# Patient Record
Sex: FEMALE | Race: OTHER | NOT HISPANIC OR LATINO
[De-identification: names, ages, dates, MRNs, and addresses within clinical notes are randomized per-mention and may not be internally consistent; named-entity substitution may affect disease eponyms.]

---

## 2023-01-01 ENCOUNTER — APPOINTMENT (OUTPATIENT)
Dept: UROLOGY | Facility: CLINIC | Age: 88
End: 2023-01-01

## 2023-01-01 ENCOUNTER — APPOINTMENT (OUTPATIENT)
Dept: UROLOGY | Facility: CLINIC | Age: 88
End: 2023-01-01
Payer: MEDICARE

## 2023-01-01 ENCOUNTER — TRANSCRIPTION ENCOUNTER (OUTPATIENT)
Age: 88
End: 2023-01-01

## 2023-01-01 VITALS
DIASTOLIC BLOOD PRESSURE: 63 MMHG | OXYGEN SATURATION: 100 % | HEART RATE: 87 BPM | SYSTOLIC BLOOD PRESSURE: 177 MMHG | BODY MASS INDEX: 20.98 KG/M2 | WEIGHT: 114 LBS | RESPIRATION RATE: 16 BRPM | TEMPERATURE: 97.8 F | HEIGHT: 62 IN

## 2023-01-01 DIAGNOSIS — C67.9 MALIGNANT NEOPLASM OF BLADDER, UNSPECIFIED: ICD-10-CM

## 2023-01-01 LAB — URINE CYTOLOGY: NORMAL

## 2023-01-01 PROCEDURE — 99024 POSTOP FOLLOW-UP VISIT: CPT

## 2023-01-01 PROCEDURE — 52000 CYSTOURETHROSCOPY: CPT

## 2023-04-16 ENCOUNTER — INPATIENT (INPATIENT)
Facility: HOSPITAL | Age: 88
LOS: 5 days | Discharge: HOME HEALTH | End: 2023-04-22
Attending: STUDENT IN AN ORGANIZED HEALTH CARE EDUCATION/TRAINING PROGRAM | Admitting: STUDENT IN AN ORGANIZED HEALTH CARE EDUCATION/TRAINING PROGRAM
Payer: MEDICARE

## 2023-04-16 VITALS
HEART RATE: 95 BPM | DIASTOLIC BLOOD PRESSURE: 73 MMHG | RESPIRATION RATE: 16 BRPM | TEMPERATURE: 98 F | SYSTOLIC BLOOD PRESSURE: 170 MMHG | OXYGEN SATURATION: 100 %

## 2023-04-16 RX ORDER — SODIUM CHLORIDE 9 MG/ML
1000 INJECTION INTRAMUSCULAR; INTRAVENOUS; SUBCUTANEOUS ONCE
Refills: 0 | Status: DISCONTINUED | OUTPATIENT
Start: 2023-04-16 | End: 2023-04-16

## 2023-04-16 RX ORDER — ONDANSETRON 8 MG/1
4 TABLET, FILM COATED ORAL ONCE
Refills: 0 | Status: COMPLETED | OUTPATIENT
Start: 2023-04-16 | End: 2023-04-16

## 2023-04-16 RX ORDER — SODIUM CHLORIDE 9 MG/ML
500 INJECTION INTRAMUSCULAR; INTRAVENOUS; SUBCUTANEOUS ONCE
Refills: 0 | Status: COMPLETED | OUTPATIENT
Start: 2023-04-16 | End: 2023-04-16

## 2023-04-16 RX ORDER — ACETAMINOPHEN 500 MG
1000 TABLET ORAL ONCE
Refills: 0 | Status: COMPLETED | OUTPATIENT
Start: 2023-04-16 | End: 2023-04-16

## 2023-04-16 RX ADMIN — SODIUM CHLORIDE 500 MILLILITER(S): 9 INJECTION INTRAMUSCULAR; INTRAVENOUS; SUBCUTANEOUS at 23:27

## 2023-04-16 RX ADMIN — ONDANSETRON 4 MILLIGRAM(S): 8 TABLET, FILM COATED ORAL at 23:28

## 2023-04-16 RX ADMIN — Medication 1000 MILLIGRAM(S): at 23:58

## 2023-04-16 RX ADMIN — Medication 400 MILLIGRAM(S): at 23:28

## 2023-04-16 NOTE — ED PROVIDER NOTE - NS ED ATTENDING STATEMENT MOD
This was a shared visit with the ANTHOYN. I reviewed and verified the documentation and independently performed the documented:

## 2023-04-16 NOTE — ED ADULT NURSE NOTE - OBJECTIVE STATEMENT
Pt received to room 25. Pt A&Ox3, ambulatory x1. Pt c/o abd pain, n/v, dysuria. Pt denies any bloody emesis, CP. Pmhx of HTN, noncompliant with meds. RR even and unlabored, pt in NAD. IV access established with 20G to R AC, labs collected and sent as per MD orders. Pt 99.1 rectally. Pt medicated as per MAR. Denies any CP, dizziness, headache. Safety measures in place, family at bedside.

## 2023-04-16 NOTE — ED PROVIDER NOTE - CLINICAL SUMMARY MEDICAL DECISION MAKING FREE TEXT BOX
87 y/o female pmh htn, hld, dementia ( a x o x2) brought by daughter, states that x 1 day shes renuka c/o severe abdominal pain, nausea, unable  to tolerate PO, + dysuria, getting worse today, denies any HA, neck pain, cough, f/c/, chest pain, sob, numbness/weakness/tingling, recent travel, sick contact, social history  on exam: + rlq, llq, periumbilical tenderness, labs, meds, ct, ua, reasses

## 2023-04-16 NOTE — ED ADULT NURSE NOTE - CHIEF COMPLAINT QUOTE
Pt. c/o abdominal pain x 2 days. Endorses urinary frequency, burning with urination, nausea, vomiting, b/l flank pain, SOB. Denies fever, chills, hematuria, chest pain.  PMHx: Early Dementia , HTN

## 2023-04-16 NOTE — ED PROVIDER NOTE - OBJECTIVE STATEMENT
87 y/o female pmh htn, hld, dementia ( a x o x2) brought by daughter, states that x 1 day shes renuka c/o severe abdominal pain, nausea, unable  to tolerate PO, + dysuria, getting worse today, denies any HA, neck pain, cough, f/c/, chest pain, sob, numbness/weakness/tingling, recent travel, sick contact, social history

## 2023-04-16 NOTE — ED PROVIDER NOTE - ATTENDING APP SHARED VISIT CONTRIBUTION OF CARE
88 year old with diffuse abd pain. concern for pancreatitis will get lipase concern for acs will get trop and ekg concern for intrabdominal process will get ct to rule out sbo enteritis, diverticulitis, bowel perf, pyelo. ua for uti. fluids, symptom control. likely admit.

## 2023-04-17 DIAGNOSIS — E87.1 HYPO-OSMOLALITY AND HYPONATREMIA: ICD-10-CM

## 2023-04-17 DIAGNOSIS — I10 ESSENTIAL (PRIMARY) HYPERTENSION: ICD-10-CM

## 2023-04-17 DIAGNOSIS — E89.0 POSTPROCEDURAL HYPOTHYROIDISM: Chronic | ICD-10-CM

## 2023-04-17 DIAGNOSIS — F03.90 UNSPECIFIED DEMENTIA WITHOUT BEHAVIORAL DISTURBANCE: ICD-10-CM

## 2023-04-17 DIAGNOSIS — E03.9 HYPOTHYROIDISM, UNSPECIFIED: ICD-10-CM

## 2023-04-17 DIAGNOSIS — K85.90 ACUTE PANCREATITIS WITHOUT NECROSIS OR INFECTION, UNSPECIFIED: ICD-10-CM

## 2023-04-17 DIAGNOSIS — N32.89 OTHER SPECIFIED DISORDERS OF BLADDER: ICD-10-CM

## 2023-04-17 DIAGNOSIS — Z29.9 ENCOUNTER FOR PROPHYLACTIC MEASURES, UNSPECIFIED: ICD-10-CM

## 2023-04-17 LAB
ALBUMIN SERPL ELPH-MCNC: 4.3 G/DL — SIGNIFICANT CHANGE UP (ref 3.3–5)
ALP SERPL-CCNC: 119 U/L — SIGNIFICANT CHANGE UP (ref 40–120)
ALT FLD-CCNC: 20 U/L — SIGNIFICANT CHANGE UP (ref 4–33)
ANION GAP SERPL CALC-SCNC: 11 MMOL/L — SIGNIFICANT CHANGE UP (ref 7–14)
ANION GAP SERPL CALC-SCNC: 14 MMOL/L — SIGNIFICANT CHANGE UP (ref 7–14)
AST SERPL-CCNC: 34 U/L — HIGH (ref 4–32)
B PERT DNA SPEC QL NAA+PROBE: SIGNIFICANT CHANGE UP
B PERT+PARAPERT DNA PNL SPEC NAA+PROBE: SIGNIFICANT CHANGE UP
BASOPHILS # BLD AUTO: 0.04 K/UL — SIGNIFICANT CHANGE UP (ref 0–0.2)
BASOPHILS NFR BLD AUTO: 0.4 % — SIGNIFICANT CHANGE UP (ref 0–2)
BILIRUB SERPL-MCNC: 1 MG/DL — SIGNIFICANT CHANGE UP (ref 0.2–1.2)
BORDETELLA PARAPERTUSSIS (RAPRVP): SIGNIFICANT CHANGE UP
BUN SERPL-MCNC: 18 MG/DL — SIGNIFICANT CHANGE UP (ref 7–23)
BUN SERPL-MCNC: 22 MG/DL — SIGNIFICANT CHANGE UP (ref 7–23)
C PNEUM DNA SPEC QL NAA+PROBE: SIGNIFICANT CHANGE UP
CALCIUM SERPL-MCNC: 8.1 MG/DL — LOW (ref 8.4–10.5)
CALCIUM SERPL-MCNC: 9.6 MG/DL — SIGNIFICANT CHANGE UP (ref 8.4–10.5)
CHLORIDE SERPL-SCNC: 96 MMOL/L — LOW (ref 98–107)
CHLORIDE SERPL-SCNC: 97 MMOL/L — LOW (ref 98–107)
CO2 SERPL-SCNC: 17 MMOL/L — LOW (ref 22–31)
CO2 SERPL-SCNC: 22 MMOL/L — SIGNIFICANT CHANGE UP (ref 22–31)
CREAT SERPL-MCNC: 0.99 MG/DL — SIGNIFICANT CHANGE UP (ref 0.5–1.3)
CREAT SERPL-MCNC: 1.25 MG/DL — SIGNIFICANT CHANGE UP (ref 0.5–1.3)
EGFR: 42 ML/MIN/1.73M2 — LOW
EGFR: 55 ML/MIN/1.73M2 — LOW
EOSINOPHIL # BLD AUTO: 0.02 K/UL — SIGNIFICANT CHANGE UP (ref 0–0.5)
EOSINOPHIL NFR BLD AUTO: 0.2 % — SIGNIFICANT CHANGE UP (ref 0–6)
FLUAV SUBTYP SPEC NAA+PROBE: SIGNIFICANT CHANGE UP
FLUBV RNA SPEC QL NAA+PROBE: SIGNIFICANT CHANGE UP
GLUCOSE SERPL-MCNC: 112 MG/DL — HIGH (ref 70–99)
GLUCOSE SERPL-MCNC: 90 MG/DL — SIGNIFICANT CHANGE UP (ref 70–99)
HADV DNA SPEC QL NAA+PROBE: SIGNIFICANT CHANGE UP
HCOV 229E RNA SPEC QL NAA+PROBE: SIGNIFICANT CHANGE UP
HCOV HKU1 RNA SPEC QL NAA+PROBE: SIGNIFICANT CHANGE UP
HCOV NL63 RNA SPEC QL NAA+PROBE: SIGNIFICANT CHANGE UP
HCOV OC43 RNA SPEC QL NAA+PROBE: SIGNIFICANT CHANGE UP
HCT VFR BLD CALC: 41.5 % — SIGNIFICANT CHANGE UP (ref 34.5–45)
HCT VFR BLD CALC: 43.4 % — SIGNIFICANT CHANGE UP (ref 34.5–45)
HGB BLD-MCNC: 13.2 G/DL — SIGNIFICANT CHANGE UP (ref 11.5–15.5)
HGB BLD-MCNC: 13.3 G/DL — SIGNIFICANT CHANGE UP (ref 11.5–15.5)
HMPV RNA SPEC QL NAA+PROBE: SIGNIFICANT CHANGE UP
HPIV1 RNA SPEC QL NAA+PROBE: SIGNIFICANT CHANGE UP
HPIV2 RNA SPEC QL NAA+PROBE: SIGNIFICANT CHANGE UP
HPIV3 RNA SPEC QL NAA+PROBE: SIGNIFICANT CHANGE UP
HPIV4 RNA SPEC QL NAA+PROBE: SIGNIFICANT CHANGE UP
IANC: 7.6 K/UL — HIGH (ref 1.8–7.4)
IMM GRANULOCYTES NFR BLD AUTO: 0.4 % — SIGNIFICANT CHANGE UP (ref 0–0.9)
LIDOCAIN IGE QN: 230 U/L — HIGH (ref 7–60)
LYMPHOCYTES # BLD AUTO: 0.88 K/UL — LOW (ref 1–3.3)
LYMPHOCYTES # BLD AUTO: 9.6 % — LOW (ref 13–44)
M PNEUMO DNA SPEC QL NAA+PROBE: SIGNIFICANT CHANGE UP
MAGNESIUM SERPL-MCNC: 2 MG/DL — SIGNIFICANT CHANGE UP (ref 1.6–2.6)
MCHC RBC-ENTMCNC: 29.6 PG — SIGNIFICANT CHANGE UP (ref 27–34)
MCHC RBC-ENTMCNC: 30.3 PG — SIGNIFICANT CHANGE UP (ref 27–34)
MCHC RBC-ENTMCNC: 30.6 GM/DL — LOW (ref 32–36)
MCHC RBC-ENTMCNC: 31.8 GM/DL — LOW (ref 32–36)
MCV RBC AUTO: 95.2 FL — SIGNIFICANT CHANGE UP (ref 80–100)
MCV RBC AUTO: 96.4 FL — SIGNIFICANT CHANGE UP (ref 80–100)
MONOCYTES # BLD AUTO: 0.58 K/UL — SIGNIFICANT CHANGE UP (ref 0–0.9)
MONOCYTES NFR BLD AUTO: 6.3 % — SIGNIFICANT CHANGE UP (ref 2–14)
NEUTROPHILS # BLD AUTO: 7.6 K/UL — HIGH (ref 1.8–7.4)
NEUTROPHILS NFR BLD AUTO: 83.1 % — HIGH (ref 43–77)
NRBC # BLD: 0 /100 WBCS — SIGNIFICANT CHANGE UP (ref 0–0)
NRBC # BLD: 0 /100 WBCS — SIGNIFICANT CHANGE UP (ref 0–0)
NRBC # FLD: 0 K/UL — SIGNIFICANT CHANGE UP (ref 0–0)
NRBC # FLD: 0 K/UL — SIGNIFICANT CHANGE UP (ref 0–0)
OSMOLALITY SERPL: 295 MOSM/KG — SIGNIFICANT CHANGE UP (ref 275–295)
PHOSPHATE SERPL-MCNC: 2.9 MG/DL — SIGNIFICANT CHANGE UP (ref 2.5–4.5)
PLATELET # BLD AUTO: 331 K/UL — SIGNIFICANT CHANGE UP (ref 150–400)
PLATELET # BLD AUTO: 338 K/UL — SIGNIFICANT CHANGE UP (ref 150–400)
POTASSIUM SERPL-MCNC: 4.1 MMOL/L — SIGNIFICANT CHANGE UP (ref 3.5–5.3)
POTASSIUM SERPL-MCNC: 4.4 MMOL/L — SIGNIFICANT CHANGE UP (ref 3.5–5.3)
POTASSIUM SERPL-SCNC: 4.1 MMOL/L — SIGNIFICANT CHANGE UP (ref 3.5–5.3)
POTASSIUM SERPL-SCNC: 4.4 MMOL/L — SIGNIFICANT CHANGE UP (ref 3.5–5.3)
PROT SERPL-MCNC: 8 G/DL — SIGNIFICANT CHANGE UP (ref 6–8.3)
RAPID RVP RESULT: SIGNIFICANT CHANGE UP
RBC # BLD: 4.36 M/UL — SIGNIFICANT CHANGE UP (ref 3.8–5.2)
RBC # BLD: 4.5 M/UL — SIGNIFICANT CHANGE UP (ref 3.8–5.2)
RBC # FLD: 13 % — SIGNIFICANT CHANGE UP (ref 10.3–14.5)
RBC # FLD: 13.1 % — SIGNIFICANT CHANGE UP (ref 10.3–14.5)
RSV RNA SPEC QL NAA+PROBE: SIGNIFICANT CHANGE UP
RV+EV RNA SPEC QL NAA+PROBE: SIGNIFICANT CHANGE UP
SARS-COV-2 RNA SPEC QL NAA+PROBE: SIGNIFICANT CHANGE UP
SODIUM SERPL-SCNC: 124 MMOL/L — LOW (ref 135–145)
SODIUM SERPL-SCNC: 133 MMOL/L — LOW (ref 135–145)
TSH SERPL-MCNC: 8.04 UIU/ML — HIGH (ref 0.27–4.2)
WBC # BLD: 6.99 K/UL — SIGNIFICANT CHANGE UP (ref 3.8–10.5)
WBC # BLD: 9.16 K/UL — SIGNIFICANT CHANGE UP (ref 3.8–10.5)
WBC # FLD AUTO: 6.99 K/UL — SIGNIFICANT CHANGE UP (ref 3.8–10.5)
WBC # FLD AUTO: 9.16 K/UL — SIGNIFICANT CHANGE UP (ref 3.8–10.5)

## 2023-04-17 RX ORDER — ATORVASTATIN CALCIUM 80 MG/1
1 TABLET, FILM COATED ORAL
Refills: 0 | DISCHARGE

## 2023-04-17 RX ORDER — OLANZAPINE 15 MG/1
2.5 TABLET, FILM COATED ORAL ONCE
Refills: 0 | Status: COMPLETED | OUTPATIENT
Start: 2023-04-17 | End: 2023-04-17

## 2023-04-17 RX ORDER — SODIUM CHLORIDE 9 MG/ML
1000 INJECTION, SOLUTION INTRAVENOUS
Refills: 0 | Status: DISCONTINUED | OUTPATIENT
Start: 2023-04-17 | End: 2023-04-18

## 2023-04-17 RX ORDER — ACETAMINOPHEN 500 MG
650 TABLET ORAL EVERY 6 HOURS
Refills: 0 | Status: DISCONTINUED | OUTPATIENT
Start: 2023-04-17 | End: 2023-04-22

## 2023-04-17 RX ORDER — DONEPEZIL HYDROCHLORIDE 10 MG/1
1 TABLET, FILM COATED ORAL
Refills: 0 | DISCHARGE

## 2023-04-17 RX ORDER — HEPARIN SODIUM 5000 [USP'U]/ML
5000 INJECTION INTRAVENOUS; SUBCUTANEOUS EVERY 12 HOURS
Refills: 0 | Status: DISCONTINUED | OUTPATIENT
Start: 2023-04-17 | End: 2023-04-20

## 2023-04-17 RX ORDER — LEVOTHYROXINE SODIUM 125 MCG
75 TABLET ORAL DAILY
Refills: 0 | Status: DISCONTINUED | OUTPATIENT
Start: 2023-04-17 | End: 2023-04-22

## 2023-04-17 RX ORDER — HYDRALAZINE HCL 50 MG
1 TABLET ORAL
Refills: 0 | DISCHARGE

## 2023-04-17 RX ORDER — HYDRALAZINE HCL 50 MG
100 TABLET ORAL EVERY 8 HOURS
Refills: 0 | Status: DISCONTINUED | OUTPATIENT
Start: 2023-04-17 | End: 2023-04-19

## 2023-04-17 RX ORDER — DONEPEZIL HYDROCHLORIDE 10 MG/1
10 TABLET, FILM COATED ORAL AT BEDTIME
Refills: 0 | Status: DISCONTINUED | OUTPATIENT
Start: 2023-04-17 | End: 2023-04-22

## 2023-04-17 RX ORDER — ATORVASTATIN CALCIUM 80 MG/1
10 TABLET, FILM COATED ORAL AT BEDTIME
Refills: 0 | Status: DISCONTINUED | OUTPATIENT
Start: 2023-04-17 | End: 2023-04-22

## 2023-04-17 RX ORDER — LEVOTHYROXINE SODIUM 125 MCG
1 TABLET ORAL
Refills: 0 | DISCHARGE

## 2023-04-17 RX ADMIN — OLANZAPINE 2.5 MILLIGRAM(S): 15 TABLET, FILM COATED ORAL at 19:59

## 2023-04-17 NOTE — H&P ADULT - PROBLEM SELECTOR PLAN 3
Na went from 133 to 124  Hold off on IVF, s/p 500cc of NS in ED  monitor BMP q6H   do not correct more than 6-8meq in 24 hours  follow up with hypothyroidism workup

## 2023-04-17 NOTE — H&P ADULT - PROBLEM SELECTOR PLAN 1
Lipase: 230, + epigastric pain  Triglycerides wnl , no hx of etoh abuse   Follow up with abdominal u/s to r/o gallstone panmetritis   Tylenol PRN for mild-moderate pain, morphine PRN if severe pain   Holding off on fluids due to Hyponatremia  Diet: Advance as tolerated

## 2023-04-17 NOTE — CONSULT NOTE ADULT - SUBJECTIVE AND OBJECTIVE BOX
HPI  88y F year old female with Hx of HTN, HLD , Hypothyroidism , Dementia presenting for abdominal pain , nausea , decreased Apetite for past 2 days secondary to acute pancreatitis. On CT imaging patient was found to have a [id number] enhancing bladder mass concerning for malignancy Patient denies any prior history of hematuria although recently has been experiencing more urinary frequency/urgency/dysuria. Patient denies history of prior urologic surgeries/procedures and does endorse prior history of UTI's. Patient denies prior smoking history. Patient denies family history of bladder, kidney, and prostate cancer.     PAST MEDICAL & SURGICAL HISTORY:  HTN (hypertension)      Dementia      H/O thyroidectomy    MEDICATIONS  (STANDING):  atorvastatin 10 milliGRAM(s) Oral at bedtime  donepezil 10 milliGRAM(s) Oral at bedtime  heparin   Injectable 5000 Unit(s) SubCutaneous every 12 hours  hydrALAZINE 100 milliGRAM(s) Oral every 8 hours  lactated ringers. 1000 milliLiter(s) (100 mL/Hr) IV Continuous <Continuous>  levothyroxine 75 MICROGram(s) Oral daily    MEDICATIONS  (PRN):  acetaminophen     Tablet .. 650 milliGRAM(s) Oral every 6 hours PRN Temp greater or equal to 38C (100.4F), Mild Pain (1 - 3)      FAMILY HISTORY:  FH: colon cancer (Child)        Allergies    aspirin (Flushing)  penicillin (Unknown)    Intolerances        SOCIAL HISTORY:    REVIEW OF SYSTEMS: Otherwise negative as stated in HPI    Physical Exam  Vital signs  T(C): 36.7 (04-17-23 @ 17:30), Max: 36.9 (04-17-23 @ 08:19)  HR: 90 (04-17-23 @ 17:30)  BP: 144/79 (04-17-23 @ 17:30)  SpO2: 100% (04-17-23 @ 17:30)  Wt(kg): --    Output      Gen:  NAD    Pulm:  No respiratory distress  	  CV:  RRR    GI:  S/ND/mild mid-epigastric tenderness    :  No CVA tenderness    MSK:      LABS:      04-17 @ 12:23    WBC 6.99  / Hct 43.4  / SCr 0.99     04-16 @ 23:29    WBC 9.16  / Hct 41.5  / SCr 1.25     04-17    124<L>  |  96<L>  |  18  ----------------------------<  90  4.4   |  17<L>  |  0.99    Ca    8.1<L>      17 Apr 2023 12:23  Phos  2.9     04-17  Mg     2.00     04-17    TPro  8.0  /  Alb  4.3  /  TBili  1.0  /  DBili  x   /  AST  34<H>  /  ALT  20  /  AlkPhos  119  04-16          Urine Cx:  Blood Cx:    RADIOLOGY:  INTERPRETATION:  CLINICAL INFORMATION: Abdominal pain    COMPARISON: None.    CONTRAST/COMPLICATIONS:  IV Contrast: Omnipaque 350  90cc administered   10 cc discarded  Oral Contrast: NONE  Complications: None reported at time of study completion    PROCEDURE:  CT of the Abdomen and Pelvis was performed.  Sagittal and coronal reformats were performed.    FINDINGS:  LOWER CHEST: Small left pleural effusion. Minimal atelectatic changes.   Coronary artery calcifications. Mitral valve annulus calcification.   Aortic valve calcification.    LIVER: Within normal limits.  BILE DUCTS: Normal caliber.  GALLBLADDER: Within normal limits.  SPLEEN: Within normal limits.  PANCREAS: Within normal limits.  ADRENALS: Within normal limits.  KIDNEYS/URETERS: Bilateral renal cysts. There are several subcentimeter   left renal calcifications. There is no hydroureter sclerosis. Mild   hydronephrosis to the level of the urinary bladder where there is a mass.    BLADDER: There is a large enhancing mass in the bladder suspicious for   malignancy. This likely penetrates the bladder wall on the left. This   measures at least 5.6 x 2.0 x 5.3 cm. There are several adjacent left   pelvic lymph nodes.  REPRODUCTIVE ORGANS: Hysterectomy.    BOWEL: Moderate amount of stool in rectum and colon. No bowel   obstruction. Appendix is not visualized. No evidence of inflammation in   the pericecal region.  PERITONEUM: Small amount of pelvic free fluid.  VESSELS: Atherosclerotic changes.  RETROPERITONEUM/LYMPH NODES: Small left pelvic lymph nodes adjacent to a   bladder mass suspicious for metastatic disease.  ABDOMINAL WALL: Within normal limits.  BONES: Degenerative changes. Grade 1 anterolisthesis L4 on L5.    IMPRESSION:    Subcentimeter nonobstructing left renal calcifications. Mild left   hydronephrosis to the level of the bladder. There is a large enhancing   mass in the bladder suspicious for malignancy as described above. This   measures at least 5.6 x 2.1 x 5.3 cm. Urological consultation is needed.      --- End of Report ---

## 2023-04-17 NOTE — PATIENT PROFILE ADULT - NSPROHMSYMPCOND_GEN_A_NUR
Stable, states ready to go home, voided, refuses fluids, ambulatory to waiting room to    cancer/cardiovascular

## 2023-04-17 NOTE — H&P ADULT - NSHPLABSRESULTS_GEN_ALL_CORE
LABS:                  13.3   6.99  )-----------( 331      ( 17 Apr 2023 12:23 )             43.4     04-17    124<L>  |  96<L>  |  18  ----------------------------<  90  4.4   |  17<L>  |  0.99    Ca    8.1<L>      17 Apr 2023 12:23  Phos  2.9     04-17  Mg     2.00     04-17    TPro  8.0  /  Alb  4.3  /  TBili  1.0  /  DBili  x   /  AST  34 <H>  /  ALT  20  /  AlkPhos  119  04-16    Ct abdomen and pelvis: Subcentimeter nonobstructing left renal calcifications. Mild left hydronephrosis to the level of the bladder. There is a large enhancing   mass in the bladder suspicious for malignancy as described above. This measures at least 5.6 x 2.1 x 5.3 cm. Urological consultation is needed.

## 2023-04-17 NOTE — H&P ADULT - NSHPREVIEWOFSYSTEMS_GEN_ALL_CORE
REVIEW OF SYSTEMS:    CONSTITUTIONAL:  No weakness, fevers or chills  EYES/ENT:  No visual changes;  No vertigo or throat pain   NECK:  No pain or stiffness  RESPIRATORY:  No cough, wheezing, hemoptysis; No shortness of breath  CARDIOVASCULAR:  No chest pain or palpitations  GASTROINTESTINAL:+ epigastric pain, with n/v. No diarrhea or constipation. No melena or hematochezia.  GENITOURINARY:  No dysuria, frequency or hematuria  MUSCULOSKELETAL:  FROM all extremities, normal strength, No calf tenderness  NEUROLOGICAL:  No numbness or weakness  SKIN:  No itching, rashes

## 2023-04-17 NOTE — PATIENT PROFILE ADULT - FALL HARM RISK - HARM RISK INTERVENTIONS

## 2023-04-17 NOTE — H&P ADULT - NSHPPHYSICALEXAM_GEN_ALL_CORE
VITAL SIGNS:  T(F): 98 (04-17-23 @ 15:25), Max: 98.4 (04-17-23 @ 08:19)  HR: 82 (04-17-23 @ 15:25) (79 - 95)  BP: 182/75 (04-17-23 @ 15:25) (151/72 - 190/77)  BP(mean): --  RR: 18 (04-17-23 @ 15:25) (16 - 18)  SpO2: 100% (04-17-23 @ 15:25) (100% - 100%)    PHYSICAL EXAM:    Constitutional: WDWN resting comfortably in bed; NAD  HEENT: NC/AT, PERRL, EOMI, anicteric sclera, no nasal discharge; uvula midline, no oropharyngeal erythema or exudates; MMM  Neck: supple; no JVD or thyromegaly  Respiratory: CTA B/L; no W/R/R, no retractions  Cardiac: +S1/S2; RRR; no M/R/G; PMI non-displaced  Gastrointestinal: soft, NT/ND; no rebound or guarding; +BSx4  Back: spine midline, no bony tenderness or step-offs; no CVAT B/L  Extremities: WWP, no clubbing or cyanosis; no peripheral edema  Musculoskeletal: NROM x4; no joint swelling, tenderness or erythema  Vascular: 2+ radial, femoral, DP/PT pulses B/L  Dermatologic: skin warm, dry and intact; no rashes, wounds, or scars  Lymphatic: no submandibular or cervical LAD  Neurologic: AAOx3; CNII-XII grossly intact; no focal deficits  Psychiatric: affect and characteristics of appearance, verbalizations, behaviors are appropriate, denies SI/HI/AH/VH

## 2023-04-17 NOTE — ED ADULT NURSE REASSESSMENT NOTE - NS ED NURSE REASSESS COMMENT FT1
Pt able to void, however urine sample was discarded. Pt notified that another sample is required. NAD noted, safety in place

## 2023-04-17 NOTE — H&P ADULT - ASSESSMENT
88 year old female with Hx of HTN, HLD , Hypothyroidism , Dementia presenting for abdominal pain , nausea , decreased Apetite for past 2 days. Admitted for Pancreatitis and new Bladder mass finding.

## 2023-04-17 NOTE — H&P ADULT - HISTORY OF PRESENT ILLNESS
88 year old female with Hx of HTN, HLD , Hypothyroidism , Dementia presenting for abdominal pain , nausea , decreased Apetite for past 2 days. Pt accompanied by daughter at bedside, most of hx obtained from daughter . As per daughter patient has also been having increased urinary frequency since January - went to PCP who ruled out UTI. Pt lives alone, able to walk on her own, whoever unable to cook for herself- daughter provides food for patient.   Denies any fever, chills, HA, neck pain, cough, chest pain, sob, numbness/weakness/tingling, recent travel, sick contact.

## 2023-04-17 NOTE — H&P ADULT - PROBLEM SELECTOR PLAN 2
Ct abdomen and pelvis: Subcentimeter nonobstructive left renal calcifications. Mild left hydronephrosis to the level of the bladder. There is a large enhancing mass in the bladder suspicious for malignancy as described above. This measures at least 5.6 x 2.1 x 5.3 cm.   -Urology consulted - appreciate recs  -Follow up with UA

## 2023-04-18 DIAGNOSIS — Z01.818 ENCOUNTER FOR OTHER PREPROCEDURAL EXAMINATION: ICD-10-CM

## 2023-04-18 DIAGNOSIS — E78.5 HYPERLIPIDEMIA, UNSPECIFIED: ICD-10-CM

## 2023-04-18 LAB
ANION GAP SERPL CALC-SCNC: 11 MMOL/L — SIGNIFICANT CHANGE UP (ref 7–14)
ANION GAP SERPL CALC-SCNC: 15 MMOL/L — HIGH (ref 7–14)
APPEARANCE UR: CLEAR — SIGNIFICANT CHANGE UP
BACTERIA # UR AUTO: NEGATIVE — SIGNIFICANT CHANGE UP
BILIRUB UR-MCNC: NEGATIVE — SIGNIFICANT CHANGE UP
BLD GP AB SCN SERPL QL: NEGATIVE — SIGNIFICANT CHANGE UP
BLD GP AB SCN SERPL QL: NEGATIVE — SIGNIFICANT CHANGE UP
BUN SERPL-MCNC: 24 MG/DL — HIGH (ref 7–23)
BUN SERPL-MCNC: 25 MG/DL — HIGH (ref 7–23)
CALCIUM SERPL-MCNC: 9.2 MG/DL — SIGNIFICANT CHANGE UP (ref 8.4–10.5)
CALCIUM SERPL-MCNC: 9.3 MG/DL — SIGNIFICANT CHANGE UP (ref 8.4–10.5)
CHLORIDE SERPL-SCNC: 102 MMOL/L — SIGNIFICANT CHANGE UP (ref 98–107)
CHLORIDE SERPL-SCNC: 102 MMOL/L — SIGNIFICANT CHANGE UP (ref 98–107)
CO2 SERPL-SCNC: 22 MMOL/L — SIGNIFICANT CHANGE UP (ref 22–31)
CO2 SERPL-SCNC: 23 MMOL/L — SIGNIFICANT CHANGE UP (ref 22–31)
COLOR SPEC: YELLOW — SIGNIFICANT CHANGE UP
CREAT SERPL-MCNC: 1.2 MG/DL — SIGNIFICANT CHANGE UP (ref 0.5–1.3)
CREAT SERPL-MCNC: 1.28 MG/DL — SIGNIFICANT CHANGE UP (ref 0.5–1.3)
DIFF PNL FLD: NEGATIVE — SIGNIFICANT CHANGE UP
EGFR: 41 ML/MIN/1.73M2 — LOW
EGFR: 44 ML/MIN/1.73M2 — LOW
EPI CELLS # UR: 5 /HPF — SIGNIFICANT CHANGE UP (ref 0–5)
GLUCOSE SERPL-MCNC: 83 MG/DL — SIGNIFICANT CHANGE UP (ref 70–99)
GLUCOSE SERPL-MCNC: 87 MG/DL — SIGNIFICANT CHANGE UP (ref 70–99)
GLUCOSE UR QL: NEGATIVE — SIGNIFICANT CHANGE UP
HCT VFR BLD CALC: 39.8 % — SIGNIFICANT CHANGE UP (ref 34.5–45)
HGB BLD-MCNC: 12.5 G/DL — SIGNIFICANT CHANGE UP (ref 11.5–15.5)
HYALINE CASTS # UR AUTO: 1 /LPF — SIGNIFICANT CHANGE UP (ref 0–7)
KETONES UR-MCNC: ABNORMAL
LEUKOCYTE ESTERASE UR-ACNC: NEGATIVE — SIGNIFICANT CHANGE UP
MAGNESIUM SERPL-MCNC: 2.3 MG/DL — SIGNIFICANT CHANGE UP (ref 1.6–2.6)
MCHC RBC-ENTMCNC: 29.6 PG — SIGNIFICANT CHANGE UP (ref 27–34)
MCHC RBC-ENTMCNC: 31.4 GM/DL — LOW (ref 32–36)
MCV RBC AUTO: 94.1 FL — SIGNIFICANT CHANGE UP (ref 80–100)
NITRITE UR-MCNC: NEGATIVE — SIGNIFICANT CHANGE UP
NRBC # BLD: 0 /100 WBCS — SIGNIFICANT CHANGE UP (ref 0–0)
NRBC # FLD: 0 K/UL — SIGNIFICANT CHANGE UP (ref 0–0)
PH UR: 6 — SIGNIFICANT CHANGE UP (ref 5–8)
PHOSPHATE SERPL-MCNC: 3.6 MG/DL — SIGNIFICANT CHANGE UP (ref 2.5–4.5)
PLATELET # BLD AUTO: 376 K/UL — SIGNIFICANT CHANGE UP (ref 150–400)
POTASSIUM SERPL-MCNC: 4.2 MMOL/L — SIGNIFICANT CHANGE UP (ref 3.5–5.3)
POTASSIUM SERPL-MCNC: 4.3 MMOL/L — SIGNIFICANT CHANGE UP (ref 3.5–5.3)
POTASSIUM SERPL-SCNC: 4.2 MMOL/L — SIGNIFICANT CHANGE UP (ref 3.5–5.3)
POTASSIUM SERPL-SCNC: 4.3 MMOL/L — SIGNIFICANT CHANGE UP (ref 3.5–5.3)
PROT UR-MCNC: ABNORMAL
RBC # BLD: 4.23 M/UL — SIGNIFICANT CHANGE UP (ref 3.8–5.2)
RBC # FLD: 12.9 % — SIGNIFICANT CHANGE UP (ref 10.3–14.5)
RBC CASTS # UR COMP ASSIST: SIGNIFICANT CHANGE UP /HPF (ref 0–4)
RH IG SCN BLD-IMP: POSITIVE — SIGNIFICANT CHANGE UP
RH IG SCN BLD-IMP: POSITIVE — SIGNIFICANT CHANGE UP
SODIUM SERPL-SCNC: 136 MMOL/L — SIGNIFICANT CHANGE UP (ref 135–145)
SODIUM SERPL-SCNC: 139 MMOL/L — SIGNIFICANT CHANGE UP (ref 135–145)
SP GR SPEC: 1.02 — SIGNIFICANT CHANGE UP (ref 1.01–1.05)
UROBILINOGEN FLD QL: SIGNIFICANT CHANGE UP
WBC # BLD: 7.87 K/UL — SIGNIFICANT CHANGE UP (ref 3.8–10.5)
WBC # FLD AUTO: 7.87 K/UL — SIGNIFICANT CHANGE UP (ref 3.8–10.5)
WBC UR QL: 5 /HPF — SIGNIFICANT CHANGE UP (ref 0–5)

## 2023-04-18 RX ORDER — POLYETHYLENE GLYCOL 3350 17 G/17G
17 POWDER, FOR SOLUTION ORAL ONCE
Refills: 0 | Status: COMPLETED | OUTPATIENT
Start: 2023-04-18 | End: 2023-04-18

## 2023-04-18 RX ORDER — SENNA PLUS 8.6 MG/1
2 TABLET ORAL AT BEDTIME
Refills: 0 | Status: DISCONTINUED | OUTPATIENT
Start: 2023-04-18 | End: 2023-04-22

## 2023-04-18 RX ORDER — LIDOCAINE 4 G/100G
1 CREAM TOPICAL DAILY
Refills: 0 | Status: DISCONTINUED | OUTPATIENT
Start: 2023-04-18 | End: 2023-04-22

## 2023-04-18 RX ORDER — GEMCITABINE 38 MG/ML
2000 INJECTION, SOLUTION INTRAVENOUS ONCE
Refills: 0 | Status: DISCONTINUED | OUTPATIENT
Start: 2023-04-18 | End: 2023-04-18

## 2023-04-18 RX ORDER — MUPIROCIN 20 MG/G
1 OINTMENT TOPICAL
Refills: 0 | Status: DISCONTINUED | OUTPATIENT
Start: 2023-04-18 | End: 2023-04-22

## 2023-04-18 RX ADMIN — Medication 100 MILLIGRAM(S): at 19:05

## 2023-04-18 RX ADMIN — POLYETHYLENE GLYCOL 3350 17 GRAM(S): 17 POWDER, FOR SOLUTION ORAL at 19:05

## 2023-04-18 RX ADMIN — Medication 650 MILLIGRAM(S): at 19:04

## 2023-04-18 RX ADMIN — Medication 650 MILLIGRAM(S): at 12:15

## 2023-04-18 RX ADMIN — DONEPEZIL HYDROCHLORIDE 10 MILLIGRAM(S): 10 TABLET, FILM COATED ORAL at 22:10

## 2023-04-18 RX ADMIN — SODIUM CHLORIDE 100 MILLILITER(S): 9 INJECTION, SOLUTION INTRAVENOUS at 05:52

## 2023-04-18 RX ADMIN — ATORVASTATIN CALCIUM 10 MILLIGRAM(S): 80 TABLET, FILM COATED ORAL at 22:10

## 2023-04-18 RX ADMIN — Medication 100 MILLIGRAM(S): at 09:12

## 2023-04-18 RX ADMIN — SENNA PLUS 2 TABLET(S): 8.6 TABLET ORAL at 22:10

## 2023-04-18 RX ADMIN — Medication 75 MICROGRAM(S): at 09:12

## 2023-04-18 RX ADMIN — Medication 10 MILLIGRAM(S): at 19:05

## 2023-04-18 RX ADMIN — HEPARIN SODIUM 5000 UNIT(S): 5000 INJECTION INTRAVENOUS; SUBCUTANEOUS at 19:06

## 2023-04-18 RX ADMIN — Medication 650 MILLIGRAM(S): at 11:13

## 2023-04-18 RX ADMIN — MUPIROCIN 1 APPLICATION(S): 20 OINTMENT TOPICAL at 19:05

## 2023-04-18 NOTE — PROGRESS NOTE ADULT - ATTENDING COMMENTS
Patient seen and examined, care plan discussed with house staff as above.    88yoF admitted with increased urinary frequency, abdominal pain and dyspnea on exertion. Found to have a large ([id number]) bladder mass on admission CT, as well as constipation and a small L pleural effusion. Cardiac murmur (systolic) heart on exam, loudest at the LSB and apex. Family reports she has had an echo w/i the past year, will procure records [ ]. EKG nonischemic. F/u TTE [ ] prior to pt going for her cystoscopy. UA noninfectious. Treat constipation (miralax oral and dulcolax suppository). Urology consulted, planning for cystoscopy and TURBT this admission.     PT dejuan.

## 2023-04-18 NOTE — PROGRESS NOTE ADULT - SUBJECTIVE AND OBJECTIVE BOX
Subjective    Patient seen and examined with daughter at bedside.   Daughter says mom is out of it this morning from anxiety medication.   Discussed plan for OR, all questions answered.     Objective    Vital signs  T(F): , Max: 98.4 (04-17-23 @ 08:19)  HR: 89 (04-18-23 @ 05:47)  BP: 143/68 (04-18-23 @ 05:47)  SpO2: 99% (04-18-23 @ 05:47)  Wt(kg): --    Output       General: NAD  Abdomen: soft/non-tender/non-distended  : no urine to review     Labs      04-18 @ 07:30    WBC 7.87  / Hct 39.8  / SCr --       04-17 @ 12:23    WBC 6.99  / Hct 43.4  / SCr 0.99

## 2023-04-18 NOTE — PROGRESS NOTE ADULT - SUBJECTIVE AND OBJECTIVE BOX
Anibal Calvert  PGY-1 Resident Physician     Patient is a 88y old  Female who presents with a chief complaint of abdominal pain (2023 08:00)      SUBJECTIVE / OVERNIGHT EVENTS:    MEDICATIONS  (STANDING):  atorvastatin 10 milliGRAM(s) Oral at bedtime  donepezil 10 milliGRAM(s) Oral at bedtime  gemcitabine Bladder Irrigation (eMAR) 2000 milliGRAM(s) IntraVesical once  heparin   Injectable 5000 Unit(s) SubCutaneous every 12 hours  hydrALAZINE 100 milliGRAM(s) Oral every 8 hours  levothyroxine 75 MICROGram(s) Oral daily  polyethylene glycol 3350 17 Gram(s) Oral once  senna 2 Tablet(s) Oral at bedtime    MEDICATIONS  (PRN):  acetaminophen     Tablet .. 650 milliGRAM(s) Oral every 6 hours PRN Temp greater or equal to 38C (100.4F), Mild Pain (1 - 3)  lidocaine   4% Patch 1 Patch Transdermal daily PRN Left Neck Pain    Allergies    aspirin (Flushing)  penicillin (Unknown)    Intolerances        Vital Signs Last 24 Hrs  T(C): 36.8 (2023 05:47), Max: 36.8 (2023 05:47)  T(F): 98.2 (2023 05:47), Max: 98.2 (2023 05:47)  HR: 89 (2023 05:47) (82 - 90)  BP: 143/68 (2023 05:47) (143/68 - 182/75)  BP(mean): --  RR: 18 (2023 05:47) (18 - 18)  SpO2: 99% (2023 05:47) (99% - 100%)    Parameters below as of 2023 05:47  Patient On (Oxygen Delivery Method): room air      Daily Height in cm: 157.48 (2023 15:25)    Daily   I&O's Summary      Physical Exam  GENERAL: NAD. Well-developed.  NEUROLOGICAL: A&O x 4. CN2-12. Strength, Sensation, ROM wnl. Brachial, ankle, babinski reflex wnl. Cerebellar signs (FTN) wnl. Gait appreciated.    HEAD: Atraumatic, normocephalic,   EYES: EOMI, PERRLA, No conjunctival or scleral injection.  NASAL/ORAL: Oral mucosa with moist membranes. Normal dentition. No pharyngeal injection or exudates.                    NECK: No lymphadenopathy. Supple, symmetric and without tracheal deviation.   CARDIAC: RRR w/ normal S1 & S2. No murmurs, rubs. & gallops. Radial & dorsal pedis pulses intact. No carotid bruits.   PULMONARY: CTA b/l w/o accessory muscle use.   GI: Soft, NT, ND, no rebound, no guarding. NABS in 4 quads. No palpable masses. No hepatosplenomegaly. No hernia visualized. No excessive scarring. Negative clancy , psoas, obturator signs.   RENAL: No lower extremity edema. No CVA tenderness.   MSK/DERM:  Examination of the (head/neck/spine/ribs/pelvis, RUE, LUE, RLE, LLE) without misalignment.  Normal ROM without pain, no spinal tenderness, normal muscle strength/tone. No rashes or ulcers noted; no subcutaneous nodules or induration palpable  PSYCH: Appropriate insight/judgment    DIAGNOSTICS:                         12.5   7.87  )-----------( 376      ( 2023 07:30 )             39.8     Hgb Trend: 12.5<--, 13.3<--, 13.2<--  04-18    136  |  102  |  24<H>  ----------------------------<  87  4.2   |  23  |  1.20    Ca    9.2      2023 09:40  Phos  3.6     04-18  Mg     2.30     04-18    TPro  8.0  /  Alb  4.3  /  TBili  1.0  /  DBili  x   /  AST  34<H>  /  ALT  20  /  AlkPhos  119  04-16    CAPILLARY BLOOD GLUCOSE        Creatinine Trend: 1.20<--, 1.28<--, 0.99<--, 1.25<--  LIVER FUNCTIONS - ( 2023 23:29 )  Alb: 4.3 g/dL / Pro: 8.0 g/dL / ALK PHOS: 119 U/L / ALT: 20 U/L / AST: 34 U/L / GGT: x                 Urinalysis Basic - ( 2023 10:51 )    Color: Yellow / Appearance: Clear / S.020 / pH: x  Gluc: x / Ketone: Small  / Bili: Negative / Urobili: <2 mg/dL   Blood: x / Protein: 30 mg/dL / Nitrite: Negative   Leuk Esterase: Negative / RBC: 10-20 /HPF / WBC 5 /HPF   Sq Epi: x / Non Sq Epi: x / Bacteria: Negative           Anibal Calvert  PGY-1 Resident Physician     Patient is a 88y old  Female who presents with a chief complaint of abdominal pain (2023 08:00)    SUBJECTIVE / OVERNIGHT EVENTS:  NAEON. Endorsing neck pain in AM. Remaining ROS (-).     MEDICATIONS  (STANDING):  atorvastatin 10 milliGRAM(s) Oral at bedtime  donepezil 10 milliGRAM(s) Oral at bedtime  gemcitabine Bladder Irrigation (eMAR) 2000 milliGRAM(s) IntraVesical once  heparin   Injectable 5000 Unit(s) SubCutaneous every 12 hours  hydrALAZINE 100 milliGRAM(s) Oral every 8 hours  levothyroxine 75 MICROGram(s) Oral daily  polyethylene glycol 3350 17 Gram(s) Oral once  senna 2 Tablet(s) Oral at bedtime    MEDICATIONS  (PRN):  acetaminophen     Tablet .. 650 milliGRAM(s) Oral every 6 hours PRN Temp greater or equal to 38C (100.4F), Mild Pain (1 - 3)  lidocaine   4% Patch 1 Patch Transdermal daily PRN Left Neck Pain    Allergies    aspirin (Flushing)  penicillin (Unknown)    Intolerances        Vital Signs Last 24 Hrs  T(C): 36.8 (2023 05:47), Max: 36.8 (2023 05:47)  T(F): 98.2 (2023 05:47), Max: 98.2 (2023 05:47)  HR: 89 (2023 05:47) (82 - 90)  BP: 143/68 (2023 05:47) (143/68 - 182/75)  BP(mean): --  RR: 18 (2023 05:47) (18 - 18)  SpO2: 99% (2023 05:47) (99% - 100%)    Parameters below as of 2023 05:47  Patient On (Oxygen Delivery Method): room air      Daily Height in cm: 157.48 (2023 15:25)    Daily   I&O's Summary      Physical Exam  GENERAL: NAD. Well-developed.  NEUROLOGICAL: A&O x 4. CN2-12. Strength, Sensation, ROM wnl. Brachial, ankle, babinski reflex wnl.                   NECK: No lymphadenopathy. Supple, symmetric and without tracheal deviation.   CARDIAC: RRR w/ normal S1 & S2. No murmurs, rubs. & gallops. Radial & dorsal pedis pulses intact. No carotid bruits.   PULMONARY: CTA b/l w/o accessory muscle use.   GI: Soft, NT, ND, no rebound, no guarding. NABS in 4 quads. No palpable masses. No hepatosplenomegaly. No hernia visualized. No excessive scarring. Negative clancy , psoas, obturator signs.   RENAL: No lower extremity edema. No CVA tenderness.   MSK/DERM:  Examination of the (head/neck/spine/ribs/pelvis, RUE, LUE, RLE, LLE) without misalignment.  Normal ROM without pain, no spinal tenderness, normal muscle strength/tone. No rashes or ulcers noted; no subcutaneous nodules or induration palpable  PSYCH: Appropriate insight/judgment    DIAGNOSTICS:                         12.5   7.87  )-----------( 376      ( 2023 07:30 )             39.8     Hgb Trend: 12.5<--, 13.3<--, 13.2<--  04-18    136  |  102  |  24<H>  ----------------------------<  87  4.2   |  23  |  1.20    Ca    9.2      2023 09:40  Phos  3.6     04-18  Mg     2.30     04-18    TPro  8.0  /  Alb  4.3  /  TBili  1.0  /  DBili  x   /  AST  34<H>  /  ALT  20  /  AlkPhos  119  04-16    CAPILLARY BLOOD GLUCOSE        Creatinine Trend: 1.20<--, 1.28<--, 0.99<--, 1.25<--  LIVER FUNCTIONS - ( 2023 23:29 )  Alb: 4.3 g/dL / Pro: 8.0 g/dL / ALK PHOS: 119 U/L / ALT: 20 U/L / AST: 34 U/L / GGT: x                 Urinalysis Basic - ( 2023 10:51 )    Color: Yellow / Appearance: Clear / S.020 / pH: x  Gluc: x / Ketone: Small  / Bili: Negative / Urobili: <2 mg/dL   Blood: x / Protein: 30 mg/dL / Nitrite: Negative   Leuk Esterase: Negative / RBC: 10-20 /HPF / WBC 5 /HPF   Sq Epi: x / Non Sq Epi: x / Bacteria: Negative

## 2023-04-19 LAB
ALBUMIN SERPL ELPH-MCNC: 4.1 G/DL — SIGNIFICANT CHANGE UP (ref 3.3–5)
ALP SERPL-CCNC: 108 U/L — SIGNIFICANT CHANGE UP (ref 40–120)
ALT FLD-CCNC: 21 U/L — SIGNIFICANT CHANGE UP (ref 4–33)
ANION GAP SERPL CALC-SCNC: 15 MMOL/L — HIGH (ref 7–14)
AST SERPL-CCNC: 31 U/L — SIGNIFICANT CHANGE UP (ref 4–32)
BILIRUB SERPL-MCNC: 1.1 MG/DL — SIGNIFICANT CHANGE UP (ref 0.2–1.2)
BUN SERPL-MCNC: 30 MG/DL — HIGH (ref 7–23)
CALCIUM SERPL-MCNC: 9.5 MG/DL — SIGNIFICANT CHANGE UP (ref 8.4–10.5)
CHLORIDE SERPL-SCNC: 102 MMOL/L — SIGNIFICANT CHANGE UP (ref 98–107)
CO2 SERPL-SCNC: 23 MMOL/L — SIGNIFICANT CHANGE UP (ref 22–31)
CREAT SERPL-MCNC: 1.25 MG/DL — SIGNIFICANT CHANGE UP (ref 0.5–1.3)
EGFR: 42 ML/MIN/1.73M2 — LOW
GLUCOSE SERPL-MCNC: 72 MG/DL — SIGNIFICANT CHANGE UP (ref 70–99)
HCT VFR BLD CALC: 44.5 % — SIGNIFICANT CHANGE UP (ref 34.5–45)
HGB BLD-MCNC: 13.9 G/DL — SIGNIFICANT CHANGE UP (ref 11.5–15.5)
MAGNESIUM SERPL-MCNC: 2.5 MG/DL — SIGNIFICANT CHANGE UP (ref 1.6–2.6)
MCHC RBC-ENTMCNC: 30 PG — SIGNIFICANT CHANGE UP (ref 27–34)
MCHC RBC-ENTMCNC: 31.2 GM/DL — LOW (ref 32–36)
MCV RBC AUTO: 95.9 FL — SIGNIFICANT CHANGE UP (ref 80–100)
NRBC # BLD: 0 /100 WBCS — SIGNIFICANT CHANGE UP (ref 0–0)
NRBC # FLD: 0 K/UL — SIGNIFICANT CHANGE UP (ref 0–0)
PHOSPHATE SERPL-MCNC: 3.7 MG/DL — SIGNIFICANT CHANGE UP (ref 2.5–4.5)
PLATELET # BLD AUTO: 418 K/UL — HIGH (ref 150–400)
POTASSIUM SERPL-MCNC: 4.9 MMOL/L — SIGNIFICANT CHANGE UP (ref 3.5–5.3)
POTASSIUM SERPL-SCNC: 4.9 MMOL/L — SIGNIFICANT CHANGE UP (ref 3.5–5.3)
PROT SERPL-MCNC: 8 G/DL — SIGNIFICANT CHANGE UP (ref 6–8.3)
RBC # BLD: 4.64 M/UL — SIGNIFICANT CHANGE UP (ref 3.8–5.2)
RBC # FLD: 13 % — SIGNIFICANT CHANGE UP (ref 10.3–14.5)
SODIUM SERPL-SCNC: 140 MMOL/L — SIGNIFICANT CHANGE UP (ref 135–145)
WBC # BLD: 7.02 K/UL — SIGNIFICANT CHANGE UP (ref 3.8–10.5)
WBC # FLD AUTO: 7.02 K/UL — SIGNIFICANT CHANGE UP (ref 3.8–10.5)

## 2023-04-19 RX ORDER — POLYETHYLENE GLYCOL 3350 17 G/17G
17 POWDER, FOR SOLUTION ORAL DAILY
Refills: 0 | Status: DISCONTINUED | OUTPATIENT
Start: 2023-04-19 | End: 2023-04-20

## 2023-04-19 RX ORDER — HYDRALAZINE HCL 50 MG
100 TABLET ORAL EVERY 8 HOURS
Refills: 0 | Status: DISCONTINUED | OUTPATIENT
Start: 2023-04-19 | End: 2023-04-22

## 2023-04-19 RX ORDER — LACTOBACILLUS ACIDOPHILUS 100MM CELL
1 CAPSULE ORAL DAILY
Refills: 0 | Status: DISCONTINUED | OUTPATIENT
Start: 2023-04-19 | End: 2023-04-22

## 2023-04-19 RX ORDER — DIBUCAINE 1 %
1 OINTMENT (GRAM) RECTAL DAILY
Refills: 0 | Status: DISCONTINUED | OUTPATIENT
Start: 2023-04-19 | End: 2023-04-22

## 2023-04-19 RX ORDER — OLANZAPINE 15 MG/1
2.5 TABLET, FILM COATED ORAL ONCE
Refills: 0 | Status: COMPLETED | OUTPATIENT
Start: 2023-04-19 | End: 2023-04-19

## 2023-04-19 RX ORDER — LATANOPROST 0.05 MG/ML
1 SOLUTION/ DROPS OPHTHALMIC; TOPICAL AT BEDTIME
Refills: 0 | Status: DISCONTINUED | OUTPATIENT
Start: 2023-04-19 | End: 2023-04-22

## 2023-04-19 RX ADMIN — MUPIROCIN 1 APPLICATION(S): 20 OINTMENT TOPICAL at 05:16

## 2023-04-19 RX ADMIN — Medication 650 MILLIGRAM(S): at 16:08

## 2023-04-19 RX ADMIN — LIDOCAINE 1 PATCH: 4 CREAM TOPICAL at 15:23

## 2023-04-19 RX ADMIN — Medication 75 MICROGRAM(S): at 05:15

## 2023-04-19 RX ADMIN — Medication 100 MILLIGRAM(S): at 21:27

## 2023-04-19 RX ADMIN — LATANOPROST 1 DROP(S): 0.05 SOLUTION/ DROPS OPHTHALMIC; TOPICAL at 22:02

## 2023-04-19 RX ADMIN — HEPARIN SODIUM 5000 UNIT(S): 5000 INJECTION INTRAVENOUS; SUBCUTANEOUS at 18:44

## 2023-04-19 RX ADMIN — Medication 650 MILLIGRAM(S): at 16:38

## 2023-04-19 RX ADMIN — DONEPEZIL HYDROCHLORIDE 10 MILLIGRAM(S): 10 TABLET, FILM COATED ORAL at 21:28

## 2023-04-19 RX ADMIN — ATORVASTATIN CALCIUM 10 MILLIGRAM(S): 80 TABLET, FILM COATED ORAL at 21:28

## 2023-04-19 RX ADMIN — Medication 10 MILLIGRAM(S): at 13:27

## 2023-04-19 RX ADMIN — HEPARIN SODIUM 5000 UNIT(S): 5000 INJECTION INTRAVENOUS; SUBCUTANEOUS at 05:15

## 2023-04-19 RX ADMIN — SENNA PLUS 2 TABLET(S): 8.6 TABLET ORAL at 21:28

## 2023-04-19 RX ADMIN — Medication 100 MILLIGRAM(S): at 05:15

## 2023-04-19 RX ADMIN — LIDOCAINE 1 PATCH: 4 CREAM TOPICAL at 19:18

## 2023-04-19 RX ADMIN — MUPIROCIN 1 APPLICATION(S): 20 OINTMENT TOPICAL at 18:44

## 2023-04-19 RX ADMIN — OLANZAPINE 2.5 MILLIGRAM(S): 15 TABLET, FILM COATED ORAL at 21:31

## 2023-04-19 NOTE — PROGRESS NOTE ADULT - SUBJECTIVE AND OBJECTIVE BOX
Anibal Calvert  PGY-1 Resident Physician     Patient is a 88y old  Female who presents with a chief complaint of abdominal pain (2023 09:02)      SUBJECTIVE / OVERNIGHT EVENTS:  NAEON. Denies CP, SOB. Endorses abdominal pain (no BM). Remaining ROS (-).     MEDICATIONS  (STANDING):  atorvastatin 10 milliGRAM(s) Oral at bedtime  bisacodyl Suppository 10 milliGRAM(s) Rectal daily  donepezil 10 milliGRAM(s) Oral at bedtime  heparin   Injectable 5000 Unit(s) SubCutaneous every 12 hours  hydrALAZINE 100 milliGRAM(s) Oral every 8 hours  latanoprost 0.005% Ophthalmic Solution 1 Drop(s) Both EYES at bedtime  levothyroxine 75 MICROGram(s) Oral daily  mupirocin 2% Ointment 1 Application(s) Topical two times a day  senna 2 Tablet(s) Oral at bedtime    MEDICATIONS  (PRN):  acetaminophen     Tablet .. 650 milliGRAM(s) Oral every 6 hours PRN Temp greater or equal to 38C (100.4F), Mild Pain (1 - 3)  lidocaine   4% Patch 1 Patch Transdermal daily PRN Left Neck Pain    Allergies    aspirin (Flushing)  penicillin (Unknown)    Intolerances        Vital Signs Last 24 Hrs  T(C): 36.6 (2023 05:08), Max: 36.6 (2023 05:08)  T(F): 97.8 (2023 05:08), Max: 97.8 (2023 05:08)  HR: 93 (2023 05:08) (82 - 93)  BP: 175/78 (2023 05:08) (140/70 - 175/78)  BP(mean): --  RR: 17 (2023 05:08) (17 - 18)  SpO2: 100% (2023 05:08) (96% - 100%)    Parameters below as of 2023 05:08  Patient On (Oxygen Delivery Method): room air      Daily     Daily   I&O's Summary      Physical Exam  GENERAL: NAD. Well-developed.  NEUROLOGICAL: A&O x 4. CN2-12. Strength, Sensation, ROM wnl.               NECK: (+) on right shoulder upon palpation  CARDIAC: RRR w/ normal S1 & S2. No murmurs, rubs. & gallops. Radial & dorsal pedis pulses intact. No carotid bruits.   PULMONARY: CTA b/l w/o accessory muscle use.   GI: Soft, NT, ND, no rebound, no guarding. NABS in 4 quads.   RENAL: No lower extremity edema. No CVA tenderness.   MSK/DERM:  Examination of the (head/neck/spine/ribs/pelvis, RUE, LUE, RLE, LLE) without misalignment.  Normal ROM without pain, no spinal tenderness, normal muscle strength/tone. No rashes or ulcers noted; no subcutaneous nodules or induration palpable  PSYCH: Appropriate insight/judgment    DIAGNOSTICS:                         13.9   7.02  )-----------( 418      ( 2023 05:25 )             44.5     Hgb Trend: 13.9<--, 12.5<--, 13.3<--, 13.2<--  04-19    140  |  102  |  30<H>  ----------------------------<  72  4.9   |  23  |  1.25    Ca    9.5      2023 05:25  Phos  3.7     04-19  Mg     2.50     04-19    TPro  8.0  /  Alb  4.1  /  TBili  1.1  /  DBili  x   /  AST  31  /  ALT  21  /  AlkPhos  108  04-19    CAPILLARY BLOOD GLUCOSE        Creatinine Trend: 1.25<--, 1.20<--, 1.28<--, 0.99<--, 1.25<--  LIVER FUNCTIONS - ( 2023 05:25 )  Alb: 4.1 g/dL / Pro: 8.0 g/dL / ALK PHOS: 108 U/L / ALT: 21 U/L / AST: 31 U/L / GGT: x                 Urinalysis Basic - ( 2023 10:51 )    Color: Yellow / Appearance: Clear / S.020 / pH: x  Gluc: x / Ketone: Small  / Bili: Negative / Urobili: <2 mg/dL   Blood: x / Protein: 30 mg/dL / Nitrite: Negative   Leuk Esterase: Negative / RBC: 10-20 /HPF / WBC 5 /HPF   Sq Epi: x / Non Sq Epi: x / Bacteria: Negative

## 2023-04-19 NOTE — DIETITIAN INITIAL EVALUATION ADULT - HEIGHT FOR BMI (CENTIMETERS)
CALL PLACED TO [place], SHE WAS INFORMED THAT DR. RUBIO IS OUT OF THE OFFICE.   THIS DEVICE IS NEW AND CANT BE ENTERED IN [facility], SO SHE NEEDS A HARD COPY TO BE SIGNED AND FAXED BACK TO HER WHEN DR. RUBIO RETURNS TO OFFICE.    157.5

## 2023-04-19 NOTE — DIETITIAN INITIAL EVALUATION ADULT - NS FNS DIET ORDER
Diet, NPO after Midnight:      NPO Start Date: 19-Apr-2023,   NPO Start Time: 23:59  Except Medications (04-19-23 @ 12:52)  Diet, DASH/TLC:   Sodium & Cholesterol Restricted (04-18-23 @ 15:42)

## 2023-04-19 NOTE — DIETITIAN INITIAL EVALUATION ADULT - PROBLEM SELECTOR PROBLEM 2
Regimen: Venofer 200 mg     Dr. Geoffrey Castaneda is supervising provider today.    Nursing Assessment: A focused nursing assessment  was performed and the patient reports the following: Bleeding: NO  Shortness of Breath: NO  Fatigue/Weakness: NO    Pre-Treatment: - Treatment consent signed  - Patient has valid pre-authorization  - VS completed  - Treatment parameters verified in patient protocol  - Patient is identified by first & last name, Date of birth that has been verified with the patient chairside.    Treatment: Refer to [facility] and MAR for line assessment and medication administration, Blood return confirmed before, during and after treatment administered and Infusion pump used for non-vesicant drugs    Post Treatment: Treatment tolerated well; no adverse reaction    Education: No new instructions needed    Next appointment scheduled:   Future Appointments   Date Time Provider Department Center   11/8/2019 10:15 AM [facility] LAB [facility]   11/8/2019 10:30 AM [facility] INFUSION RN [facility]   11/12/2019 10:30 AM [facility] INFUSION RN [facility]   11/12/2019 10:45 AM Dominga Alcantara MD [facility]   12/12/2019 12:00 PM [facility] LAB [facility]   12/12/2019 12:15 PM [facility] TEAM ROOM [facility]   1/9/2020  1:00 PM [facility] LAB [facility]   1/9/2020  1:30 PM Virginia Gordon PA-C [facility]   3/12/2020 11:15 AM [facility]   3/12/2020 11:30 AM [facility]     Patient instructed to call the office with any questions or concerns.    Patient Discharged: patient discharged to home per self, ambulatory     Bladder mass

## 2023-04-19 NOTE — DIETITIAN INITIAL EVALUATION ADULT - PERTINENT LABORATORY DATA
04-19    140  |  102  |  30<H>  ----------------------------<  72  4.9   |  23  |  1.25    Ca    9.5      19 Apr 2023 05:25  Phos  3.7     04-19  Mg     2.50     04-19    TPro  8.0  /  Alb  4.1  /  TBili  1.1  /  DBili  x   /  AST  31  /  ALT  21  /  AlkPhos  108  04-19

## 2023-04-19 NOTE — PROGRESS NOTE ADULT - ATTENDING COMMENTS
Patient seen and examined, care plan discussed with house staff as above.    88yoF admitted with increased urinary frequency, abdominal pain and dyspnea on exertion. Found to have a large ([id number]) bladder mass on admission CT, as well as constipation and a small L pleural effusion. Cardiac murmur (systolic) heart on exam, loudest at the LSB and apex. Family reports she has had an echo w/i the past year, will procure records [ ]. EKG nonischemic. F/u TTE [ mild-mod AR with mild AS] prior to pt going for her cystoscopy. UA noninfectious. Treat constipation (miralax oral and dulcolax suppository). Urology consulted, planning for cystoscopy and TURBT this admission on 4/20.     Monitor I/Os, BUN slightly uptrending, does not meet criteria for HILARIO prsently.     PT eval. Patient seen and examined, care plan discussed with house staff as above.    88yoF admitted with increased urinary frequency, abdominal pain and dyspnea on exertion. Found to have a large ([id number]) bladder mass on admission CT, as well as constipation and a small L pleural effusion. Cardiac murmur (systolic) heart on exam, loudest at the LSB and apex. Family reports she has had an echo w/i the past year, will procure records [ ]. EKG nonischemic. F/u TTE [ mild-mod AR with mild AS] prior to pt going for her cystoscopy. UA noninfectious. Treat constipation (miralax oral and dulcolax suppository). Urology consulted, planning for cystoscopy and TURBT this admission on 4/20.     Monitor I/Os, BUN slightly uptrending, does not meet criteria for HILARIO prsently. Needs aggressive bowel regimen.    PT eval.

## 2023-04-19 NOTE — DIETITIAN INITIAL EVALUATION ADULT - ORAL INTAKE PTA/DIET HISTORY
Patient is sleeping during visit. Collateral obtained from daughter by bedside. Patient lives alone, daughter usually provides food for patient. Daughter notices that whenever she brings food over to patient's house, the food remains in the fridge. Daughter reports patient has ~40lbs weight loss and has decreased appetite since the summer of last year. Patient is taking Ensure 8oz ~2bottles per day prior to hospitalization. Patient has no known food allergies.

## 2023-04-19 NOTE — DIETITIAN INITIAL EVALUATION ADULT - PERTINENT MEDS FT
MEDICATIONS  (STANDING):  atorvastatin 10 milliGRAM(s) Oral at bedtime  bisacodyl Suppository 10 milliGRAM(s) Rectal daily  donepezil 10 milliGRAM(s) Oral at bedtime  heparin   Injectable 5000 Unit(s) SubCutaneous every 12 hours  hydrALAZINE 100 milliGRAM(s) Oral every 8 hours  latanoprost 0.005% Ophthalmic Solution 1 Drop(s) Both EYES at bedtime  levothyroxine 75 MICROGram(s) Oral daily  mupirocin 2% Ointment 1 Application(s) Topical two times a day  senna 2 Tablet(s) Oral at bedtime    MEDICATIONS  (PRN):  acetaminophen     Tablet .. 650 milliGRAM(s) Oral every 6 hours PRN Temp greater or equal to 38C (100.4F), Mild Pain (1 - 3)  lidocaine   4% Patch 1 Patch Transdermal daily PRN Left Neck Pain

## 2023-04-19 NOTE — DIETITIAN INITIAL EVALUATION ADULT - OTHER INFO
88F w/ HTN, HLD, Hypothyroidism, & dementia w/ abdominal pain & increased urinary frequency. ED course relevant for lipase of 130 w/ CT A/P w/ notable bladder mass. Admitted for w/u of bladder mass, per chart.   Patient's daughter reports patient did not eat much in the past couple days(~5days). Patient is upgraded to regular diet from clear liquid since 4/18/2023. Daughter reports patient has partial denture, experience difficulty swallowing at times. Recommend a speech and swallow evaluation to determine diet consistency. Denies any nausea, vomiting, diarrhea during visit. No bowel movement since admission (~2days). As per internal medicine note dated 4/18/2023, patient has constipation, on suppository and bowel regimen. Continue to monitor bowel movement. Adequate po intake, small frequent meals, different oral nutritional supplements are discussed with daughter during visit. RD provided nutrition education on pancreatitis nutrition therapy. Patient's daughter is receptive to information provided.

## 2023-04-20 LAB
ALBUMIN SERPL ELPH-MCNC: 3.7 G/DL — SIGNIFICANT CHANGE UP (ref 3.3–5)
ALP SERPL-CCNC: 98 U/L — SIGNIFICANT CHANGE UP (ref 40–120)
ALT FLD-CCNC: 20 U/L — SIGNIFICANT CHANGE UP (ref 4–33)
ANION GAP SERPL CALC-SCNC: 15 MMOL/L — HIGH (ref 7–14)
APTT BLD: 32.6 SEC — SIGNIFICANT CHANGE UP (ref 27–36.3)
AST SERPL-CCNC: 28 U/L — SIGNIFICANT CHANGE UP (ref 4–32)
BASOPHILS # BLD AUTO: 0.02 K/UL — SIGNIFICANT CHANGE UP (ref 0–0.2)
BASOPHILS NFR BLD AUTO: 0.2 % — SIGNIFICANT CHANGE UP (ref 0–2)
BILIRUB SERPL-MCNC: 1.1 MG/DL — SIGNIFICANT CHANGE UP (ref 0.2–1.2)
BUN SERPL-MCNC: 32 MG/DL — HIGH (ref 7–23)
CALCIUM SERPL-MCNC: 9.2 MG/DL — SIGNIFICANT CHANGE UP (ref 8.4–10.5)
CHLORIDE SERPL-SCNC: 101 MMOL/L — SIGNIFICANT CHANGE UP (ref 98–107)
CO2 SERPL-SCNC: 19 MMOL/L — LOW (ref 22–31)
CREAT SERPL-MCNC: 1.31 MG/DL — HIGH (ref 0.5–1.3)
EGFR: 39 ML/MIN/1.73M2 — LOW
EOSINOPHIL # BLD AUTO: 0.03 K/UL — SIGNIFICANT CHANGE UP (ref 0–0.5)
EOSINOPHIL NFR BLD AUTO: 0.4 % — SIGNIFICANT CHANGE UP (ref 0–6)
GLUCOSE SERPL-MCNC: 111 MG/DL — HIGH (ref 70–99)
HCT VFR BLD CALC: 40.3 % — SIGNIFICANT CHANGE UP (ref 34.5–45)
HGB BLD-MCNC: 13.2 G/DL — SIGNIFICANT CHANGE UP (ref 11.5–15.5)
IANC: 6.69 K/UL — SIGNIFICANT CHANGE UP (ref 1.8–7.4)
IMM GRANULOCYTES NFR BLD AUTO: 0.4 % — SIGNIFICANT CHANGE UP (ref 0–0.9)
INR BLD: 1.1 RATIO — SIGNIFICANT CHANGE UP (ref 0.88–1.16)
LYMPHOCYTES # BLD AUTO: 1.01 K/UL — SIGNIFICANT CHANGE UP (ref 1–3.3)
LYMPHOCYTES # BLD AUTO: 12 % — LOW (ref 13–44)
MAGNESIUM SERPL-MCNC: 2.4 MG/DL — SIGNIFICANT CHANGE UP (ref 1.6–2.6)
MCHC RBC-ENTMCNC: 30.5 PG — SIGNIFICANT CHANGE UP (ref 27–34)
MCHC RBC-ENTMCNC: 32.8 GM/DL — SIGNIFICANT CHANGE UP (ref 32–36)
MCV RBC AUTO: 93.1 FL — SIGNIFICANT CHANGE UP (ref 80–100)
MONOCYTES # BLD AUTO: 0.67 K/UL — SIGNIFICANT CHANGE UP (ref 0–0.9)
MONOCYTES NFR BLD AUTO: 7.9 % — SIGNIFICANT CHANGE UP (ref 2–14)
NEUTROPHILS # BLD AUTO: 6.69 K/UL — SIGNIFICANT CHANGE UP (ref 1.8–7.4)
NEUTROPHILS NFR BLD AUTO: 79.1 % — HIGH (ref 43–77)
NRBC # BLD: 0 /100 WBCS — SIGNIFICANT CHANGE UP (ref 0–0)
NRBC # FLD: 0 K/UL — SIGNIFICANT CHANGE UP (ref 0–0)
PHOSPHATE SERPL-MCNC: 3.5 MG/DL — SIGNIFICANT CHANGE UP (ref 2.5–4.5)
PLATELET # BLD AUTO: 401 K/UL — HIGH (ref 150–400)
POTASSIUM SERPL-MCNC: 4.3 MMOL/L — SIGNIFICANT CHANGE UP (ref 3.5–5.3)
POTASSIUM SERPL-SCNC: 4.3 MMOL/L — SIGNIFICANT CHANGE UP (ref 3.5–5.3)
PROT SERPL-MCNC: 7.6 G/DL — SIGNIFICANT CHANGE UP (ref 6–8.3)
PROTHROM AB SERPL-ACNC: 12.8 SEC — SIGNIFICANT CHANGE UP (ref 10.5–13.4)
RBC # BLD: 4.33 M/UL — SIGNIFICANT CHANGE UP (ref 3.8–5.2)
RBC # FLD: 12.9 % — SIGNIFICANT CHANGE UP (ref 10.3–14.5)
SODIUM SERPL-SCNC: 135 MMOL/L — SIGNIFICANT CHANGE UP (ref 135–145)
WBC # BLD: 8.45 K/UL — SIGNIFICANT CHANGE UP (ref 3.8–10.5)
WBC # FLD AUTO: 8.45 K/UL — SIGNIFICANT CHANGE UP (ref 3.8–10.5)

## 2023-04-20 RX ORDER — HYDROMORPHONE HYDROCHLORIDE 2 MG/ML
0.25 INJECTION INTRAMUSCULAR; INTRAVENOUS; SUBCUTANEOUS
Refills: 0 | Status: DISCONTINUED | OUTPATIENT
Start: 2023-04-20 | End: 2023-04-20

## 2023-04-20 RX ORDER — LIDOCAINE 4 G/100G
1 CREAM TOPICAL EVERY 4 HOURS
Refills: 0 | Status: DISCONTINUED | OUTPATIENT
Start: 2023-04-20 | End: 2023-04-21

## 2023-04-20 RX ORDER — SODIUM CHLORIDE 9 MG/ML
1000 INJECTION, SOLUTION INTRAVENOUS
Refills: 0 | Status: DISCONTINUED | OUTPATIENT
Start: 2023-04-20 | End: 2023-04-22

## 2023-04-20 RX ORDER — ONDANSETRON 8 MG/1
4 TABLET, FILM COATED ORAL ONCE
Refills: 0 | Status: DISCONTINUED | OUTPATIENT
Start: 2023-04-20 | End: 2023-04-20

## 2023-04-20 RX ORDER — HYDROMORPHONE HYDROCHLORIDE 2 MG/ML
0.5 INJECTION INTRAMUSCULAR; INTRAVENOUS; SUBCUTANEOUS
Refills: 0 | Status: DISCONTINUED | OUTPATIENT
Start: 2023-04-20 | End: 2023-04-20

## 2023-04-20 RX ORDER — QUETIAPINE FUMARATE 200 MG/1
25 TABLET, FILM COATED ORAL ONCE
Refills: 0 | Status: DISCONTINUED | OUTPATIENT
Start: 2023-04-20 | End: 2023-04-20

## 2023-04-20 RX ORDER — LANOLIN ALCOHOL/MO/W.PET/CERES
3 CREAM (GRAM) TOPICAL AT BEDTIME
Refills: 0 | Status: DISCONTINUED | OUTPATIENT
Start: 2023-04-20 | End: 2023-04-22

## 2023-04-20 RX ORDER — GEMCITABINE 38 MG/ML
2000 INJECTION, SOLUTION INTRAVENOUS ONCE
Refills: 0 | Status: DISCONTINUED | OUTPATIENT
Start: 2023-04-20 | End: 2023-04-22

## 2023-04-20 RX ORDER — POLYETHYLENE GLYCOL 3350 17 G/17G
17 POWDER, FOR SOLUTION ORAL
Refills: 0 | Status: DISCONTINUED | OUTPATIENT
Start: 2023-04-20 | End: 2023-04-22

## 2023-04-20 RX ADMIN — MUPIROCIN 1 APPLICATION(S): 20 OINTMENT TOPICAL at 18:04

## 2023-04-20 RX ADMIN — LIDOCAINE 1 APPLICATION(S): 4 CREAM TOPICAL at 23:31

## 2023-04-20 RX ADMIN — LIDOCAINE 1 PATCH: 4 CREAM TOPICAL at 03:01

## 2023-04-20 RX ADMIN — Medication 75 MICROGRAM(S): at 05:19

## 2023-04-20 RX ADMIN — Medication 100 MILLIGRAM(S): at 21:50

## 2023-04-20 RX ADMIN — Medication 100 MILLIGRAM(S): at 18:01

## 2023-04-20 RX ADMIN — MUPIROCIN 1 APPLICATION(S): 20 OINTMENT TOPICAL at 05:20

## 2023-04-20 RX ADMIN — Medication 1 TABLET(S): at 18:01

## 2023-04-20 RX ADMIN — ATORVASTATIN CALCIUM 10 MILLIGRAM(S): 80 TABLET, FILM COATED ORAL at 21:51

## 2023-04-20 RX ADMIN — LATANOPROST 1 DROP(S): 0.05 SOLUTION/ DROPS OPHTHALMIC; TOPICAL at 21:53

## 2023-04-20 RX ADMIN — Medication 650 MILLIGRAM(S): at 01:10

## 2023-04-20 RX ADMIN — DONEPEZIL HYDROCHLORIDE 10 MILLIGRAM(S): 10 TABLET, FILM COATED ORAL at 21:51

## 2023-04-20 RX ADMIN — SENNA PLUS 2 TABLET(S): 8.6 TABLET ORAL at 21:51

## 2023-04-20 RX ADMIN — Medication 1 APPLICATION(S): at 18:04

## 2023-04-20 RX ADMIN — Medication 3 MILLIGRAM(S): at 21:51

## 2023-04-20 RX ADMIN — SODIUM CHLORIDE 100 MILLILITER(S): 9 INJECTION, SOLUTION INTRAVENOUS at 17:48

## 2023-04-20 RX ADMIN — Medication 100 MILLIGRAM(S): at 05:20

## 2023-04-20 RX ADMIN — Medication 1 TABLET(S): at 18:02

## 2023-04-20 RX ADMIN — HYDROMORPHONE HYDROCHLORIDE 0.5 MILLIGRAM(S): 2 INJECTION INTRAMUSCULAR; INTRAVENOUS; SUBCUTANEOUS at 16:10

## 2023-04-20 RX ADMIN — POLYETHYLENE GLYCOL 3350 17 GRAM(S): 17 POWDER, FOR SOLUTION ORAL at 18:04

## 2023-04-20 RX ADMIN — Medication 650 MILLIGRAM(S): at 00:14

## 2023-04-20 RX ADMIN — Medication 650 MILLIGRAM(S): at 22:50

## 2023-04-20 RX ADMIN — Medication 650 MILLIGRAM(S): at 21:51

## 2023-04-20 NOTE — PRE-OP CHECKLIST - PATIENT'S PERSONAL PROPERTY GIVEN TO
[facility] pharmacy called requesting clarification on lisinipril-hctz 20-12.5 script. Is it take one tab in a.m. and 1/2 tab in evening? Please advise. on unit

## 2023-04-20 NOTE — SWALLOW BEDSIDE ASSESSMENT ADULT - COMMENTS
Consult received for clinical swallow evaluation. However, per discussion with Team 5, patient NPO for OR today, and this service to be reconsulted as patient's condition becomes medically optimized

## 2023-04-20 NOTE — PROGRESS NOTE ADULT - PROBLEM SELECTOR PLAN 7
-DVT Ppx: hep sq  -Diet: regular  -PRNs: senna, ducolax, miralax  -Code Status:   -Dispo: inpt mgmt
-DVT Ppx: hep sq  -Diet: regular  -PRNs: senna, ducolax, miralax  -Code Status: Full  -Dispo: inpt mgmt
-DVT Ppx: hep sq  -Diet: regular  -PRNs: senna, ducolax, miralax  -Code Status:   -Dispo: inpt mgmt

## 2023-04-20 NOTE — BRIEF OPERATIVE NOTE - OPERATION/FINDINGS
TURBT of left lateral posterior wall and Right lateral posterior wall masses (sent separate), Gemcitabine instillation.

## 2023-04-20 NOTE — PROGRESS NOTE ADULT - NSPROGADDITIONALINFOA_GEN_ALL_CORE
Greater than 50 minutes spent with patient and on patient's care plan.

## 2023-04-20 NOTE — PROGRESS NOTE ADULT - SUBJECTIVE AND OBJECTIVE BOX
Anibal Calvert  PGY-1 Resident Physician     Patient is a 88y old  Female who presents with a chief complaint of abdominal pain (2023 09:17)    SUBJECTIVE / OVERNIGHT EVENTS:  Was agitated o/n, requiring zyprexa. In AM, agitated w/     MEDICATIONS  (STANDING):  atorvastatin 10 milliGRAM(s) Oral at bedtime  dibucaine 1% Ointment 1 Application(s) Topical daily  donepezil 10 milliGRAM(s) Oral at bedtime  hydrALAZINE 100 milliGRAM(s) Oral every 8 hours  lactobacillus acidophilus 1 Tablet(s) Oral daily  latanoprost 0.005% Ophthalmic Solution 1 Drop(s) Both EYES at bedtime  levothyroxine 75 MICROGram(s) Oral daily  melatonin 3 milliGRAM(s) Oral at bedtime  multivitamin 1 Tablet(s) Oral daily  mupirocin 2% Ointment 1 Application(s) Topical two times a day  polyethylene glycol 3350 17 Gram(s) Oral daily  senna 2 Tablet(s) Oral at bedtime    MEDICATIONS  (PRN):  acetaminophen     Tablet .. 650 milliGRAM(s) Oral every 6 hours PRN Temp greater or equal to 38C (100.4F), Mild Pain (1 - 3)  lidocaine   4% Patch 1 Patch Transdermal daily PRN Left Neck Pain    Allergies    aspirin (Flushing)  penicillin (Unknown)    Intolerances        Vital Signs Last 24 Hrs  T(C): 36.7 (2023 05:25), Max: 37.1 (2023 14:05)  T(F): 98.1 (2023 05:25), Max: 98.7 (2023 14:05)  HR: 97 (2023 05:25) (81 - 97)  BP: 131/58 (2023 05:25) (128/51 - 131/58)  BP(mean): --  RR: 17 (2023 05:25) (17 - 17)  SpO2: 98% (2023 05:25) (98% - 100%)    Parameters below as of 2023 05:25  Patient On (Oxygen Delivery Method): room air      Daily     Daily   I&O's Summary    2023 07:01  -  2023 07:00  --------------------------------------------------------  IN: 200 mL / OUT: 450 mL / NET: -250 mL        Physical Exam  GENERAL: NAD. Well-developed.  NEUROLOGICAL: A&O x 4. CN2-12. Strength, Sensation, ROM wnl. Brachial, ankle, babinski reflex wnl. Cerebellar signs (FTN) wnl. Gait appreciated.    HEAD: Atraumatic, normocephalic,   EYES: EOMI, PERRLA, No conjunctival or scleral injection.  NASAL/ORAL: Oral mucosa with moist membranes. Normal dentition. No pharyngeal injection or exudates.                    NECK: No lymphadenopathy. Supple, symmetric and without tracheal deviation.   CARDIAC: RRR w/ normal S1 & S2. No murmurs, rubs. & gallops. Radial & dorsal pedis pulses intact. No carotid bruits.   PULMONARY: CTA b/l w/o accessory muscle use.   GI: Soft, NT, ND, no rebound, no guarding. NABS in 4 quads. No palpable masses. No hepatosplenomegaly. No hernia visualized. No excessive scarring. Negative clancy , psoas, obturator signs.   RENAL: No lower extremity edema. No CVA tenderness.   MSK/DERM:  Examination of the (head/neck/spine/ribs/pelvis, RUE, LUE, RLE, LLE) without misalignment.  Normal ROM without pain, no spinal tenderness, normal muscle strength/tone. No rashes or ulcers noted; no subcutaneous nodules or induration palpable  PSYCH: Appropriate insight/judgment    DIAGNOSTICS:                         13.2   8.45  )-----------( 401      ( 2023 04:15 )             40.3     Hgb Trend: 13.2<--, 13.9<--, 12.5<--, 13.3<--, 13.2<--  04-20    135  |  101  |  32<H>  ----------------------------<  111<H>  4.3   |  19<L>  |  1.31<H>    Ca    9.2      2023 04:15  Phos  3.5     04-20  Mg     2.40     04-20    TPro  7.6  /  Alb  3.7  /  TBili  1.1  /  DBili  x   /  AST  28  /  ALT  20  /  AlkPhos  98  04-20    CAPILLARY BLOOD GLUCOSE        Creatinine Trend: 1.31<--, 1.25<--, 1.20<--, 1.28<--, 0.99<--, 1.25<--  LIVER FUNCTIONS - ( 2023 04:15 )  Alb: 3.7 g/dL / Pro: 7.6 g/dL / ALK PHOS: 98 U/L / ALT: 20 U/L / AST: 28 U/L / GGT: x           PT/INR - ( 2023 04:15 )   PT: 12.8 sec;   INR: 1.10 ratio         PTT - ( 2023 04:15 )  PTT:32.6 sec      Urinalysis Basic - ( 2023 10:51 )    Color: Yellow / Appearance: Clear / S.020 / pH: x  Gluc: x / Ketone: Small  / Bili: Negative / Urobili: <2 mg/dL   Blood: x / Protein: 30 mg/dL / Nitrite: Negative   Leuk Esterase: Negative / RBC: 10-20 /HPF / WBC 5 /HPF   Sq Epi: x / Non Sq Epi: x / Bacteria: Negative           Anibal Calvert  PGY-1 Resident Physician     Patient is a 88y old  Female who presents with a chief complaint of abdominal pain (2023 09:17)    SUBJECTIVE / OVERNIGHT EVENTS:  Was agitated o/n, requiring Zyprexa. In AM, agitated. 1 BM prior day.     MEDICATIONS  (STANDING):  atorvastatin 10 milliGRAM(s) Oral at bedtime  dibucaine 1% Ointment 1 Application(s) Topical daily  donepezil 10 milliGRAM(s) Oral at bedtime  hydrALAZINE 100 milliGRAM(s) Oral every 8 hours  lactobacillus acidophilus 1 Tablet(s) Oral daily  latanoprost 0.005% Ophthalmic Solution 1 Drop(s) Both EYES at bedtime  levothyroxine 75 MICROGram(s) Oral daily  melatonin 3 milliGRAM(s) Oral at bedtime  multivitamin 1 Tablet(s) Oral daily  mupirocin 2% Ointment 1 Application(s) Topical two times a day  polyethylene glycol 3350 17 Gram(s) Oral daily  senna 2 Tablet(s) Oral at bedtime    MEDICATIONS  (PRN):  acetaminophen     Tablet .. 650 milliGRAM(s) Oral every 6 hours PRN Temp greater or equal to 38C (100.4F), Mild Pain (1 - 3)  lidocaine   4% Patch 1 Patch Transdermal daily PRN Left Neck Pain    Allergies    aspirin (Flushing)  penicillin (Unknown)    Intolerances        Vital Signs Last 24 Hrs  T(C): 36.7 (2023 05:25), Max: 37.1 (2023 14:05)  T(F): 98.1 (2023 05:25), Max: 98.7 (2023 14:05)  HR: 97 (2023 05:25) (81 - 97)  BP: 131/58 (2023 05:25) (128/51 - 131/58)  BP(mean): --  RR: 17 (2023 05:25) (17 - 17)  SpO2: 98% (2023 05:25) (98% - 100%)    Parameters below as of 2023 05:25  Patient On (Oxygen Delivery Method): room air      Daily     Daily   I&O's Summary    2023 07:01  -  2023 07:00  --------------------------------------------------------  IN: 200 mL / OUT: 450 mL / NET: -250 mL        Physical Exam  GENERAL: NAD. Well-developed.  NEUROLOGICAL: A&O x 4. CN2-12. Strength, Sensation, ROM wnl.               NECK: (+) on right shoulder upon palpation  CARDIAC: RRR w/ normal S1 & S2. No murmurs, rubs. & gallops. Radial & dorsal pedis pulses intact. No carotid bruits.   PULMONARY: CTA b/l w/o accessory muscle use.   GI: Soft, NT, ND, no rebound, no guarding. NABS in 4 quads.   RENAL: No lower extremity edema. No CVA tenderness.   MSK/DERM:  Examination of the (head/neck/spine/ribs/pelvis, RUE, LUE, RLE, LLE) without misalignment.  Normal ROM without pain, no spinal tenderness, normal muscle strength/tone. No rashes or ulcers noted; no subcutaneous nodules or induration palpable  PSYCH: Appropriate insight/judgment    DIAGNOSTICS:                         13.2   8.45  )-----------( 401      ( 2023 04:15 )             40.3     Hgb Trend: 13.2<--, 13.9<--, 12.5<--, 13.3<--, 13.2<--  04-20    135  |  101  |  32<H>  ----------------------------<  111<H>  4.3   |  19<L>  |  1.31<H>    Ca    9.2      2023 04:15  Phos  3.5     04-20  Mg     2.40     04-20    TPro  7.6  /  Alb  3.7  /  TBili  1.1  /  DBili  x   /  AST  28  /  ALT  20  /  AlkPhos  98  04-20    CAPILLARY BLOOD GLUCOSE        Creatinine Trend: 1.31<--, 1.25<--, 1.20<--, 1.28<--, 0.99<--, 1.25<--  LIVER FUNCTIONS - ( 2023 04:15 )  Alb: 3.7 g/dL / Pro: 7.6 g/dL / ALK PHOS: 98 U/L / ALT: 20 U/L / AST: 28 U/L / GGT: x           PT/INR - ( 2023 04:15 )   PT: 12.8 sec;   INR: 1.10 ratio         PTT - ( 2023 04:15 )  PTT:32.6 sec      Urinalysis Basic - ( 2023 10:51 )    Color: Yellow / Appearance: Clear / S.020 / pH: x  Gluc: x / Ketone: Small  / Bili: Negative / Urobili: <2 mg/dL   Blood: x / Protein: 30 mg/dL / Nitrite: Negative   Leuk Esterase: Negative / RBC: 10-20 /HPF / WBC 5 /HPF   Sq Epi: x / Non Sq Epi: x / Bacteria: Negative

## 2023-04-20 NOTE — BRIEF OPERATIVE NOTE - NSICDXBRIEFPROCEDURE_GEN_ALL_CORE_FT
PROCEDURES:  TURBT, with chemotherapeutic agent instillation into bladder 20-Apr-2023 14:53:12  Katy Veliz

## 2023-04-20 NOTE — PROVIDER CONTACT NOTE (OTHER) - RECOMMENDATIONS
administer schedule blood pressure medication, and Tylenol for pain then reassess.
notify MD
notify MD
pt takes hydralazine 100 TID at home.
MD notified. There was no parameters on diastolic BP for hydralazine 100mg.

## 2023-04-20 NOTE — PROGRESS NOTE ADULT - SUBJECTIVE AND OBJECTIVE BOX
Subjective    Cleared for OR today    Objective    Vital signs  T(F): , Max: 98.7 (04-19-23 @ 14:05)  HR: 97 (04-20-23 @ 05:25)  BP: 131/58 (04-20-23 @ 05:25)  SpO2: 98% (04-20-23 @ 05:25)  Wt(kg): --    Output     04-19 @ 07:01  -  04-20 @ 07:00  --------------------------------------------------------  IN: 200 mL / OUT: 450 mL / NET: -250 mL        General: NAD  Abdomen: soft/non-tender/non-distended  : voiding    Labs      04-20 @ 04:15    WBC 8.45  / Hct 40.3  / SCr 1.31     04-19 @ 05:25    WBC 7.02  / Hct 44.5  / SCr 1.25

## 2023-04-20 NOTE — PROGRESS NOTE ADULT - SUBJECTIVE AND OBJECTIVE BOX
Note    Post op Check    s/p TURBT  EBL 0ml    Patient seen and examined with c/o urethral burning, otherwise denies pain/nausea.     Vital Signs Last 24 Hrs  T(C): 36.4 (20 Apr 2023 17:10), Max: 36.7 (20 Apr 2023 05:25)  T(F): 97.5 (20 Apr 2023 17:10), Max: 98.1 (20 Apr 2023 05:25)  HR: 90 (20 Apr 2023 16:25) (81 - 98)  BP: 155/62 (20 Apr 2023 17:10) (130/90 - 174/57)  BP(mean): 85 (20 Apr 2023 16:25) (76 - 88)  RR: 16 (20 Apr 2023 17:10) (15 - 22)  SpO2: 99% (20 Apr 2023 17:10) (92% - 100%)    Parameters below as of 20 Apr 2023 17:10  Patient On (Oxygen Delivery Method): room air    I&O's Summary    19 Apr 2023 07:01  -  20 Apr 2023 07:00  --------------------------------------------------------  IN: 200 mL / OUT: 450 mL / NET: -250 mL    20 Apr 2023 07:01  -  20 Apr 2023 20:44  --------------------------------------------------------  IN: 280 mL / OUT: 280 mL / NET: 0 mL    PHYSICAL EXAM:   Constitutional: well appearing, no distress    Respiratory: clear     Cardiovascular: regular     Gastrointestinal: soft, nontender    Genitourinary: hernandez in place, draining well, clear light pink.     Extremities: venodynes in place

## 2023-04-20 NOTE — PRE-OP CHECKLIST - BP NONINVASIVE DIASTOLIC (MM HG)
61 5-Fu Counseling: 5-Fluorouracil Counseling:  I discussed with the patient the risks of 5-fluorouracil including but not limited to erythema, scaling, itching, weeping, crusting, and pain.

## 2023-04-20 NOTE — PROVIDER CONTACT NOTE (OTHER) - ASSESSMENT
Pt c/o pain, asymptomatic
pt A&Ox1-2 /75, asymptomatic
pt a&ox2. VSS.
pt a&ox2. VSS.
/51. HR 97. SpO2 100%, temp 98.7F.

## 2023-04-20 NOTE — PROGRESS NOTE ADULT - ATTENDING COMMENTS
Patient seen and examined, care plan discussed with house staff as above.    88yoF admitted with increased urinary frequency, abdominal pain and dyspnea on exertion. Found to have a large ([id number]) bladder mass on admission CT, as well as constipation and a small L pleural effusion. Cardiac murmur (systolic) heart on exam, loudest at the LSB and apex. Family reports she has had an echo w/i the past year, will procure records [ ]. EKG nonischemic. F/u TTE [ mild-mod AR with mild AS] prior to pt going for her cystoscopy. UA noninfectious. Treat constipation (miralax oral and dulcolax suppository), last BM on 4/19. Urology consulted, planning for cystoscopy and TURBT this admission on 4/20.     Monitor I/Os, new HILARIO on 4/20, likely retention related to large bladder mass, going for cystoscopy today. Cont to trend BMP daily, IVF x 24 hours.     PT eval.

## 2023-04-20 NOTE — PROVIDER CONTACT NOTE (OTHER) - BACKGROUND
pt admitted for abdominal pain, concern for bladder malignancy.
pt admitted with acute pancreatitis
Pt admitted for acute pancreatitis.
Pt admitted for acute pancreatitis
pt admitted with acute pancreatitis

## 2023-04-21 LAB
ALBUMIN SERPL ELPH-MCNC: 3.9 G/DL — SIGNIFICANT CHANGE UP (ref 3.3–5)
ALP SERPL-CCNC: 94 U/L — SIGNIFICANT CHANGE UP (ref 40–120)
ALT FLD-CCNC: 21 U/L — SIGNIFICANT CHANGE UP (ref 4–33)
ANION GAP SERPL CALC-SCNC: 17 MMOL/L — HIGH (ref 7–14)
AST SERPL-CCNC: 31 U/L — SIGNIFICANT CHANGE UP (ref 4–32)
BILIRUB SERPL-MCNC: 0.8 MG/DL — SIGNIFICANT CHANGE UP (ref 0.2–1.2)
BUN SERPL-MCNC: 31 MG/DL — HIGH (ref 7–23)
CALCIUM SERPL-MCNC: 9.6 MG/DL — SIGNIFICANT CHANGE UP (ref 8.4–10.5)
CHLORIDE SERPL-SCNC: 103 MMOL/L — SIGNIFICANT CHANGE UP (ref 98–107)
CO2 SERPL-SCNC: 17 MMOL/L — LOW (ref 22–31)
CREAT SERPL-MCNC: 1.3 MG/DL — SIGNIFICANT CHANGE UP (ref 0.5–1.3)
EGFR: 40 ML/MIN/1.73M2 — LOW
GLUCOSE SERPL-MCNC: 115 MG/DL — HIGH (ref 70–99)
HCT VFR BLD CALC: 39.6 % — SIGNIFICANT CHANGE UP (ref 34.5–45)
HGB BLD-MCNC: 12.5 G/DL — SIGNIFICANT CHANGE UP (ref 11.5–15.5)
MAGNESIUM SERPL-MCNC: 2.2 MG/DL — SIGNIFICANT CHANGE UP (ref 1.6–2.6)
MCHC RBC-ENTMCNC: 29.2 PG — SIGNIFICANT CHANGE UP (ref 27–34)
MCHC RBC-ENTMCNC: 31.6 GM/DL — LOW (ref 32–36)
MCV RBC AUTO: 92.5 FL — SIGNIFICANT CHANGE UP (ref 80–100)
NRBC # BLD: 0 /100 WBCS — SIGNIFICANT CHANGE UP (ref 0–0)
NRBC # FLD: 0 K/UL — SIGNIFICANT CHANGE UP (ref 0–0)
PHOSPHATE SERPL-MCNC: 3.3 MG/DL — SIGNIFICANT CHANGE UP (ref 2.5–4.5)
PLATELET # BLD AUTO: 429 K/UL — HIGH (ref 150–400)
POTASSIUM SERPL-MCNC: 4.3 MMOL/L — SIGNIFICANT CHANGE UP (ref 3.5–5.3)
POTASSIUM SERPL-SCNC: 4.3 MMOL/L — SIGNIFICANT CHANGE UP (ref 3.5–5.3)
PROT SERPL-MCNC: 7.9 G/DL — SIGNIFICANT CHANGE UP (ref 6–8.3)
RBC # BLD: 4.28 M/UL — SIGNIFICANT CHANGE UP (ref 3.8–5.2)
RBC # FLD: 12.8 % — SIGNIFICANT CHANGE UP (ref 10.3–14.5)
SODIUM SERPL-SCNC: 137 MMOL/L — SIGNIFICANT CHANGE UP (ref 135–145)
WBC # BLD: 9.98 K/UL — SIGNIFICANT CHANGE UP (ref 3.8–10.5)
WBC # FLD AUTO: 9.98 K/UL — SIGNIFICANT CHANGE UP (ref 3.8–10.5)

## 2023-04-21 RX ORDER — QUETIAPINE FUMARATE 200 MG/1
25 TABLET, FILM COATED ORAL ONCE
Refills: 0 | Status: COMPLETED | OUTPATIENT
Start: 2023-04-21 | End: 2023-04-21

## 2023-04-21 RX ORDER — LIDOCAINE HCL 20 MG/ML
3 VIAL (ML) INJECTION DAILY
Refills: 0 | Status: DISCONTINUED | OUTPATIENT
Start: 2023-04-21 | End: 2023-04-22

## 2023-04-21 RX ORDER — HEPARIN SODIUM 5000 [USP'U]/ML
5000 INJECTION INTRAVENOUS; SUBCUTANEOUS EVERY 12 HOURS
Refills: 0 | Status: DISCONTINUED | OUTPATIENT
Start: 2023-04-21 | End: 2023-04-22

## 2023-04-21 RX ADMIN — SODIUM CHLORIDE 100 MILLILITER(S): 9 INJECTION, SOLUTION INTRAVENOUS at 02:20

## 2023-04-21 RX ADMIN — POLYETHYLENE GLYCOL 3350 17 GRAM(S): 17 POWDER, FOR SOLUTION ORAL at 17:38

## 2023-04-21 RX ADMIN — MUPIROCIN 1 APPLICATION(S): 20 OINTMENT TOPICAL at 06:03

## 2023-04-21 RX ADMIN — Medication 100 MILLIGRAM(S): at 06:01

## 2023-04-21 RX ADMIN — Medication 100 MILLIGRAM(S): at 13:58

## 2023-04-21 RX ADMIN — MUPIROCIN 1 APPLICATION(S): 20 OINTMENT TOPICAL at 17:37

## 2023-04-21 RX ADMIN — LATANOPROST 1 DROP(S): 0.05 SOLUTION/ DROPS OPHTHALMIC; TOPICAL at 22:17

## 2023-04-21 RX ADMIN — Medication 100 MILLIGRAM(S): at 22:16

## 2023-04-21 RX ADMIN — Medication 650 MILLIGRAM(S): at 22:17

## 2023-04-21 RX ADMIN — Medication 75 MICROGRAM(S): at 07:47

## 2023-04-21 RX ADMIN — Medication 1 TABLET(S): at 12:05

## 2023-04-21 RX ADMIN — Medication 1 APPLICATION(S): at 12:05

## 2023-04-21 RX ADMIN — POLYETHYLENE GLYCOL 3350 17 GRAM(S): 17 POWDER, FOR SOLUTION ORAL at 06:02

## 2023-04-21 RX ADMIN — DONEPEZIL HYDROCHLORIDE 10 MILLIGRAM(S): 10 TABLET, FILM COATED ORAL at 22:15

## 2023-04-21 RX ADMIN — HEPARIN SODIUM 5000 UNIT(S): 5000 INJECTION INTRAVENOUS; SUBCUTANEOUS at 17:38

## 2023-04-21 RX ADMIN — LIDOCAINE 1 APPLICATION(S): 4 CREAM TOPICAL at 10:26

## 2023-04-21 RX ADMIN — Medication 3 MILLIGRAM(S): at 22:16

## 2023-04-21 RX ADMIN — Medication 650 MILLIGRAM(S): at 23:17

## 2023-04-21 RX ADMIN — SENNA PLUS 2 TABLET(S): 8.6 TABLET ORAL at 22:16

## 2023-04-21 RX ADMIN — ATORVASTATIN CALCIUM 10 MILLIGRAM(S): 80 TABLET, FILM COATED ORAL at 22:16

## 2023-04-21 RX ADMIN — QUETIAPINE FUMARATE 25 MILLIGRAM(S): 200 TABLET, FILM COATED ORAL at 01:07

## 2023-04-21 NOTE — PROGRESS NOTE ADULT - SUBJECTIVE AND OBJECTIVE BOX
Anibal Calvert  PGY-1 Resident Physician     Patient is a 88y old  Female who presents with a chief complaint of abdominal pain (20 Apr 2023 20:43)      SUBJECTIVE / OVERNIGHT EVENTS:  Required Seroquel o/n for agitation. ROS w/o acute complaints in AM. Pt to have hernandez removed.     MEDICATIONS  (STANDING):  atorvastatin 10 milliGRAM(s) Oral at bedtime  dibucaine 1% Ointment 1 Application(s) Topical daily  donepezil 10 milliGRAM(s) Oral at bedtime  gemcitabine Bladder Irrigation (eMAR) 2000 milliGRAM(s) IntraVesical once  heparin   Injectable 5000 Unit(s) SubCutaneous every 12 hours  hydrALAZINE 100 milliGRAM(s) Oral every 8 hours  lactated ringers. 1000 milliLiter(s) (100 mL/Hr) IV Continuous <Continuous>  lactobacillus acidophilus 1 Tablet(s) Oral daily  latanoprost 0.005% Ophthalmic Solution 1 Drop(s) Both EYES at bedtime  levothyroxine 75 MICROGram(s) Oral daily  melatonin 3 milliGRAM(s) Oral at bedtime  multivitamin 1 Tablet(s) Oral daily  mupirocin 2% Ointment 1 Application(s) Topical two times a day  polyethylene glycol 3350 17 Gram(s) Oral two times a day  senna 2 Tablet(s) Oral at bedtime    MEDICATIONS  (PRN):  acetaminophen     Tablet .. 650 milliGRAM(s) Oral every 6 hours PRN Temp greater or equal to 38C (100.4F), Mild Pain (1 - 3)  lidocaine   4% Patch 1 Patch Transdermal daily PRN Left Neck Pain  lidocaine 4% Cream 1 Application(s) Topical every 4 hours PRN hernandez/urethral pain    Allergies    aspirin (Flushing)  penicillin (Unknown)    Intolerances        Vital Signs Last 24 Hrs  T(C): 36.4 (21 Apr 2023 06:07), Max: 36.7 (20 Apr 2023 12:22)  T(F): 97.6 (21 Apr 2023 06:07), Max: 98.1 (20 Apr 2023 22:50)  HR: 90 (21 Apr 2023 06:07) (87 - 98)  BP: 156/59 (21 Apr 2023 06:07) (143/56 - 174/57)  BP(mean): 85 (20 Apr 2023 16:25) (76 - 88)  RR: 17 (21 Apr 2023 06:07) (15 - 22)  SpO2: 97% (21 Apr 2023 06:07) (92% - 100%)    Parameters below as of 21 Apr 2023 06:07  Patient On (Oxygen Delivery Method): room air      Daily Height in cm: 157.48 (20 Apr 2023 11:19)    Daily   I&O's Summary    20 Apr 2023 07:01  -  21 Apr 2023 07:00  --------------------------------------------------------  IN: 1380 mL / OUT: 280 mL / NET: 1100 mL        Physical Exam  GENERAL: NAD. Well-developed.  NEUROLOGICAL: A&O x 4. CN2-12. Strength, Sensation, ROM wnl.               NECK: (+) on right shoulder upon palpation  CARDIAC: RRR w/ normal S1 & S2. No murmurs, rubs. & gallops. Radial & dorsal pedis pulses intact. No carotid bruits.   PULMONARY: CTA b/l w/o accessory muscle use.   GI: Soft, NT, ND, no rebound, no guarding. NABS in 4 quads.   RENAL: No lower extremity edema. No CVA tenderness. (+) hernandez inplace  MSK/DERM:  Examination of the (head/neck/spine/ribs/pelvis, RUE, LUE, RLE, LLE) without misalignment.   PSYCH: Appropriate insight/judgment    DIAGNOSTICS:                         12.5   9.98  )-----------( 429      ( 21 Apr 2023 04:05 )             39.6     Hgb Trend: 12.5<--, 13.2<--, 13.9<--, 12.5<--, 13.3<--  04-21    137  |  103  |  31<H>  ----------------------------<  115<H>  4.3   |  17<L>  |  1.30    Ca    9.6      21 Apr 2023 04:05  Phos  3.3     04-21  Mg     2.20     04-21    TPro  7.9  /  Alb  3.9  /  TBili  0.8  /  DBili  x   /  AST  31  /  ALT  21  /  AlkPhos  94  04-21    CAPILLARY BLOOD GLUCOSE        Creatinine Trend: 1.30<--, 1.31<--, 1.25<--, 1.20<--, 1.28<--, 0.99<--  LIVER FUNCTIONS - ( 21 Apr 2023 04:05 )  Alb: 3.9 g/dL / Pro: 7.9 g/dL / ALK PHOS: 94 U/L / ALT: 21 U/L / AST: 31 U/L / GGT: x           PT/INR - ( 20 Apr 2023 04:15 )   PT: 12.8 sec;   INR: 1.10 ratio         PTT - ( 20 Apr 2023 04:15 )  PTT:32.6 sec

## 2023-04-21 NOTE — CONSULT NOTE ADULT - ASSESSMENT
88 yr old female s/p TURBT and Gemcitabine infusion fro suspected bladder mass seen on CT. Some evidence for potential rectal involvement so CRS consulted for 2nd opinion on management.    Plan  - Patient would benefit from colonoscopy if mass suspected  - Would sent CEA and CA 19-9 levels  - f/u final path from urology procedure    Discussed with Dr. Chan on behalf of Dr. Constantine KOENIG Team Surgery  [id number]
87yo F admitted for abdominal pain secondary to presumed pancreatitis found to have large bladder mass on CT imaging.    Recommendations  - Patient has been added on for cystoscopy and TURBT for OR tomorrow  - COVID negative status appreciated  - Patient will need two active type and screens  - NPO after midnight with IV fluids starting at midnight  - Please document medical clearance for OR tomorrow  - Spoke with daughter and believes that going to the operating room if within the patients goals of care. Will need to consent daughter for OR tomorrow. Risks and benefits have already been explained but formal consent needs to be documented.  - Discussed with Dr. Soto

## 2023-04-21 NOTE — PROGRESS NOTE ADULT - SUBJECTIVE AND OBJECTIVE BOX
Subjective    Patient seen and examined with daughter at bedside. States she is feeling well, has some burning with catheter.     Objective    Vital signs  T(F): , Max: 98.1 (04-20-23 @ 22:50)  HR: 90 (04-21-23 @ 06:07)  BP: 156/59 (04-21-23 @ 06:07)  SpO2: 97% (04-21-23 @ 06:07)  Wt(kg): --    Output     04-20 @ 07:01  -  04-21 @ 07:00  --------------------------------------------------------  IN: 1380 mL / OUT: 280 mL / NET: 1100 mL        General: NAD  Abdomen: soft/non-tender/non-distended  : hernandez draining clear yellow urine, removed     Labs      04-21 @ 04:05    WBC 9.98  / Hct 39.6  / SCr 1.30     04-20 @ 04:15    WBC 8.45  / Hct 40.3  / SCr 1.31

## 2023-04-21 NOTE — PHYSICAL THERAPY INITIAL EVALUATION ADULT - GENERAL OBSERVATIONS, REHAB EVAL
Pt found in bed; patient agreeable to PT with words of encouragement. Pt found in bed; patient agreeable to PT with words of encouragement. HR 85bpm; grand daughter at bedside.

## 2023-04-21 NOTE — PROGRESS NOTE ADULT - SUBJECTIVE AND OBJECTIVE BOX
ANESTHESIA POSTOP CHECK    88y Female POSTOP DAY 1 S/P     [ X] NO APPARENT ANESTHESIA COMPLICATIONS      Comments:

## 2023-04-21 NOTE — PHYSICAL THERAPY INITIAL EVALUATION ADULT - PERTINENT HX OF CURRENT PROBLEM, REHAB EVAL
88 year old female with Hx of HTN, HLD , Hypothyroidism , dementia presenting for abdominal pain , nausea, decreased appetite for past 2 days. As per chart, pt accompanied by daughter at bedside, most of hx obtained from daughter. As per daughter patient has also been having increased urinary frequency since January - went to PCP who ruled out UTI. CT A/P with notable bladder mass. Admitted for w/u of bladder mass.

## 2023-04-21 NOTE — PROGRESS NOTE ADULT - ATTENDING COMMENTS
Patient seen and examined, care plan discussed with house staff as above.    88yoF admitted with increased urinary frequency, abdominal pain and dyspnea on exertion. Found to have a large ([id number]) bladder mass on admission CT, as well as constipation and a small L pleural effusion. Cardiac murmur (systolic) heart on exam, loudest at the LSB and apex. Family reports she has had an echo w/i the past year, will procure records [ ]. EKG nonischemic. F/u TTE [mild-mod AR with mild AS] prior to pt going for her cystoscopy. UA noninfectious. Treat constipation (miralax oral and dulcolax suppository), last BM on 4/19. Urology consulted, cystoscopy and TURBT this admission on 4/20, f/u bx results [ ]. Urology rec'd colorectal consult.     Monitor I/Os, new HILARIO on 4/20, improving post TURBT. Currently on a TOV (hernandez removed am of 4/21 by Urology), can d/c home if she passes on 4/22.     PT eval.

## 2023-04-21 NOTE — PHYSICAL THERAPY INITIAL EVALUATION ADULT - NSPTDISCHREC_GEN_A_CORE
Anticipated discharge to home with home PT services to improve functional mobility and strength, to optimize safety within the home environment, and to allow pt to return to prior level of function with 24 hour assist from family Anticipated discharge to home with home PT services to improve functional mobility and strength, to optimize safety within the home environment, and to allow pt to return to prior level of function with 24 hour assist from family. Spoke with grand daughter at bedside and family doesn't want rehab for patient due to current mental status. Anticipated discharge to home with home PT services to improve functional mobility and strength, to optimize safety within the home environment, and to allow pt to return to prior level of function as long as patient has 24 hour assist from family.

## 2023-04-21 NOTE — PHYSICAL THERAPY INITIAL EVALUATION ADULT - PLANNED THERAPY INTERVENTIONS, PT EVAL
Patient left positioned for safety in bed in NAD, call bell in reach, all lines intact. +bed alarm Patient left positioned for safety in bed in NAD, call bell in reach, all lines intact. +bed alarm/balance training/bed mobility training/gait training/strengthening/transfer training

## 2023-04-21 NOTE — CONSULT NOTE ADULT - SUBJECTIVE AND OBJECTIVE BOX
VASCULAR SURGERY CONSULT NOTE  --------------------------------------------------------------------------------------------    Patient is a 88y old  Female who presents with a chief complaint of abdominal pain (21 Apr 2023 09:26)      HPI: HPI:  88 year old female with Hx of HTN, HLD , Hypothyroidism , Dementia presenting for abdominal pain , nausea , decreased Apetite for past 2 days. Pt accompanied by daughter at bedside, most of hx obtained from daughter . As per daughter patient has also been having increased urinary frequency since January - went to PCP who ruled out UTI. Pt lives alone, able to walk on her own, whoever unable to cook for herself- daughter provides food for patient.   Denies any fever, chills, HA, neck pain, cough, chest pain, sob, numbness/weakness/tingling, recent travel, sick contact.    (17 Apr 2023 15:24)    Patient with bladder mass seen on CT. Now s/p TURBT and gemcitabine infusion for mass seen on R and L posterior bladder wall. Thickness seen on CT and rectal exam performed with concern for rectal involvement. CRS consulted for 2nd opinion regarding colonic involvement and if the mass is bladder invading colon or colon invading bladder.      ROS: 10-system review is otherwise negative except HPI above.      PAST MEDICAL & SURGICAL HISTORY:  HTN (hypertension)  Dementia  H/O thyroidectomy    FAMILY HISTORY:  FH: colon cancer (Child)    SOCIAL HISTORY:      ALLERGIES: aspirin (Flushing)  penicillin (Unknown)      HOME MEDICATIONS:     CURRENT MEDICATIONS  MEDICATIONS (STANDING): atorvastatin 10 milliGRAM(s) Oral at bedtime  donepezil 10 milliGRAM(s) Oral at bedtime  gemcitabine Bladder Irrigation (eMAR) 2000 milliGRAM(s) IntraVesical once  heparin   Injectable 5000 Unit(s) SubCutaneous every 12 hours  hydrALAZINE 100 milliGRAM(s) Oral every 8 hours  lactated ringers. 1000 milliLiter(s) IV Continuous <Continuous>  lactobacillus acidophilus 1 Tablet(s) Oral daily  levothyroxine 75 MICROGram(s) Oral daily  melatonin 3 milliGRAM(s) Oral at bedtime  multivitamin 1 Tablet(s) Oral daily  polyethylene glycol 3350 17 Gram(s) Oral two times a day  senna 2 Tablet(s) Oral at bedtime    MEDICATIONS (PRN):acetaminophen     Tablet .. 650 milliGRAM(s) Oral every 6 hours PRN Temp greater or equal to 38C (100.4F), Mild Pain (1 - 3)  lidocaine 2% Jelly 3 milliLiter(s) IntraUrethral daily PRN Pain    --------------------------------------------------------------------------------------------    Vitals:   T(C): 36.7 (04-21-23 @ 12:42), Max: 36.7 (04-20-23 @ 22:50)  HR: 90 (04-21-23 @ 12:42) (87 - 94)  BP: 138/56 (04-21-23 @ 12:42) (138/56 - 174/57)  RR: 18 (04-21-23 @ 12:42) (16 - 22)  SpO2: 100% (04-21-23 @ 12:42) (92% - 100%)  CAPILLARY BLOOD GLUCOSE        CAPILLARY BLOOD GLUCOSE          04-20 @ 07:01  -  04-21 @ 07:00  --------------------------------------------------------  IN:    Lactated Ringers: 1200 mL    Oral Fluid: 180 mL  Total IN: 1380 mL    OUT:    Indwelling Catheter - Urethral (mL): 100 mL    Indwelling Catheter - Urethral (mL): 180 mL  Total OUT: 280 mL    Total NET: 1100 mL        Height (cm): 157.5 (04-20 @ 11:34)  Weight (kg): 48.6 (04-20 @ 11:34)  BMI (kg/m2): 19.6 (04-20 @ 11:34)  BSA (m2): 1.47 (04-20 @ 11:34)    PHYSICAL EXAM:   General: NAD, Lying in bed comfortably  Neuro: A+O x1-2, does not remember anything from days past  HEENT: NC/AT, EOMI  Neck: Soft, supple  Cardio: NSR  Resp: Good effort  GI/Abd: Soft, NT/ND, no rebound/guarding, no masses palpated  Rectal: Firmness palpated on anterior rectal wall. No hematochezia or melena      --------------------------------------------------------------------------------------------    LABS  CBC (04-21 @ 04:05)                              12.5                           9.98    )----------------(  429<H>     --    % Neutrophils, --    % Lymphocytes, ANC: --                                  39.6    CBC (04-20 @ 04:15)                              13.2                           8.45    )----------------(  401<H>     79.1<H>% Neutrophils, 12.0<L>% Lymphocytes, ANC: 6.69                                40.3      BMP (04-21 @ 04:05)             137     |  103     |  31<H> 		Ca++ --      Ca 9.6                ---------------------------------( 115<H>		Mg 2.20               4.3     |  17<L>   |  1.30  			Ph 3.3     BMP (04-20 @ 04:15)             135     |  101     |  32<H> 		Ca++ --      Ca 9.2                ---------------------------------( 111<H>		Mg 2.40               4.3     |  19<L>   |  1.31<H>			Ph 3.5       LFTs (04-21 @ 04:05)      TPro 7.9 / Alb 3.9 / TBili 0.8 / DBili -- / AST 31 / ALT 21 / AlkPhos 94  LFTs (04-20 @ 04:15)      TPro 7.6 / Alb 3.7 / TBili 1.1 / DBili -- / AST 28 / ALT 20 / AlkPhos 98    Coags (04-20 @ 04:15)  aPTT 32.6 / INR 1.10 / PT 12.8      --------------------------------------------------------------------------------------------    MICROBIOLOGY      --------------------------------------------------------------------------------------------    IMAGING  CT A/P: No comment on rectal mass    Subcentimeter nonobstructing left renal calcifications. Mild left hydronephrosis to the level of the bladder. There is a large enhancing mass in the bladder suspicious for malignancy as described above. This measures at least 5.6 x 2.1 x 5.3 cm.   Suspicious for metastatic disease   COLORECTAL SURGERY CONSULT NOTE  --------------------------------------------------------------------------------------------    Patient is a 88y old  Female who presents with a chief complaint of abdominal pain (21 Apr 2023 09:26)      HPI: HPI:  88 year old female with Hx of HTN, HLD , Hypothyroidism , Dementia presenting for abdominal pain , nausea , decreased Apetite for past 2 days. Pt accompanied by daughter at bedside, most of hx obtained from daughter . As per daughter patient has also been having increased urinary frequency since January - went to PCP who ruled out UTI. Pt lives alone, able to walk on her own, whoever unable to cook for herself- daughter provides food for patient.   Denies any fever, chills, HA, neck pain, cough, chest pain, sob, numbness/weakness/tingling, recent travel, sick contact.    (17 Apr 2023 15:24)    Patient with bladder mass seen on CT. Now s/p TURBT and gemcitabine infusion for mass seen on R and L posterior bladder wall. Thickness seen on CT and rectal exam performed with concern for rectal involvement. CRS consulted for 2nd opinion regarding colonic involvement and if the mass is bladder invading colon or colon invading bladder.      ROS: 10-system review is otherwise negative except HPI above.      PAST MEDICAL & SURGICAL HISTORY:  HTN (hypertension)  Dementia  H/O thyroidectomy    FAMILY HISTORY:  FH: colon cancer (Child)    SOCIAL HISTORY:      ALLERGIES: aspirin (Flushing)  penicillin (Unknown)      HOME MEDICATIONS:     CURRENT MEDICATIONS  MEDICATIONS (STANDING): atorvastatin 10 milliGRAM(s) Oral at bedtime  donepezil 10 milliGRAM(s) Oral at bedtime  gemcitabine Bladder Irrigation (eMAR) 2000 milliGRAM(s) IntraVesical once  heparin   Injectable 5000 Unit(s) SubCutaneous every 12 hours  hydrALAZINE 100 milliGRAM(s) Oral every 8 hours  lactated ringers. 1000 milliLiter(s) IV Continuous <Continuous>  lactobacillus acidophilus 1 Tablet(s) Oral daily  levothyroxine 75 MICROGram(s) Oral daily  melatonin 3 milliGRAM(s) Oral at bedtime  multivitamin 1 Tablet(s) Oral daily  polyethylene glycol 3350 17 Gram(s) Oral two times a day  senna 2 Tablet(s) Oral at bedtime    MEDICATIONS (PRN):acetaminophen     Tablet .. 650 milliGRAM(s) Oral every 6 hours PRN Temp greater or equal to 38C (100.4F), Mild Pain (1 - 3)  lidocaine 2% Jelly 3 milliLiter(s) IntraUrethral daily PRN Pain    --------------------------------------------------------------------------------------------    Vitals:   T(C): 36.7 (04-21-23 @ 12:42), Max: 36.7 (04-20-23 @ 22:50)  HR: 90 (04-21-23 @ 12:42) (87 - 94)  BP: 138/56 (04-21-23 @ 12:42) (138/56 - 174/57)  RR: 18 (04-21-23 @ 12:42) (16 - 22)  SpO2: 100% (04-21-23 @ 12:42) (92% - 100%)  CAPILLARY BLOOD GLUCOSE        CAPILLARY BLOOD GLUCOSE          04-20 @ 07:01  -  04-21 @ 07:00  --------------------------------------------------------  IN:    Lactated Ringers: 1200 mL    Oral Fluid: 180 mL  Total IN: 1380 mL    OUT:    Indwelling Catheter - Urethral (mL): 100 mL    Indwelling Catheter - Urethral (mL): 180 mL  Total OUT: 280 mL    Total NET: 1100 mL        Height (cm): 157.5 (04-20 @ 11:34)  Weight (kg): 48.6 (04-20 @ 11:34)  BMI (kg/m2): 19.6 (04-20 @ 11:34)  BSA (m2): 1.47 (04-20 @ 11:34)    PHYSICAL EXAM:   General: NAD, Lying in bed comfortably  Neuro: A+O x1-2, does not remember anything from days past  HEENT: NC/AT, EOMI  Neck: Soft, supple  Cardio: NSR  Resp: Good effort  GI/Abd: Soft, NT/ND, no rebound/guarding, no masses palpated  Rectal: Firmness palpated on anterior rectal wall. No hematochezia or melena      --------------------------------------------------------------------------------------------    LABS  CBC (04-21 @ 04:05)                              12.5                           9.98    )----------------(  429<H>     --    % Neutrophils, --    % Lymphocytes, ANC: --                                  39.6    CBC (04-20 @ 04:15)                              13.2                           8.45    )----------------(  401<H>     79.1<H>% Neutrophils, 12.0<L>% Lymphocytes, ANC: 6.69                                40.3      BMP (04-21 @ 04:05)             137     |  103     |  31<H> 		Ca++ --      Ca 9.6                ---------------------------------( 115<H>		Mg 2.20               4.3     |  17<L>   |  1.30  			Ph 3.3     BMP (04-20 @ 04:15)             135     |  101     |  32<H> 		Ca++ --      Ca 9.2                ---------------------------------( 111<H>		Mg 2.40               4.3     |  19<L>   |  1.31<H>			Ph 3.5       LFTs (04-21 @ 04:05)      TPro 7.9 / Alb 3.9 / TBili 0.8 / DBili -- / AST 31 / ALT 21 / AlkPhos 94  LFTs (04-20 @ 04:15)      TPro 7.6 / Alb 3.7 / TBili 1.1 / DBili -- / AST 28 / ALT 20 / AlkPhos 98    Coags (04-20 @ 04:15)  aPTT 32.6 / INR 1.10 / PT 12.8      --------------------------------------------------------------------------------------------    MICROBIOLOGY      --------------------------------------------------------------------------------------------    IMAGING  CT A/P: No comment on rectal mass    Subcentimeter nonobstructing left renal calcifications. Mild left hydronephrosis to the level of the bladder. There is a large enhancing mass in the bladder suspicious for malignancy as described above. This measures at least 5.6 x 2.1 x 5.3 cm.   Suspicious for metastatic disease

## 2023-04-21 NOTE — PHYSICAL THERAPY INITIAL EVALUATION ADULT - ADDITIONAL COMMENTS
As per grand daughter at bedside, patient has 3 steps and a landing and another 4 steps with unilateral railing to enter her home and then can remain on first floor.

## 2023-04-22 VITALS
TEMPERATURE: 98 F | DIASTOLIC BLOOD PRESSURE: 55 MMHG | OXYGEN SATURATION: 100 % | HEART RATE: 97 BPM | RESPIRATION RATE: 18 BRPM | SYSTOLIC BLOOD PRESSURE: 138 MMHG

## 2023-04-22 LAB
ALBUMIN SERPL ELPH-MCNC: 3.6 G/DL — SIGNIFICANT CHANGE UP (ref 3.3–5)
ALP SERPL-CCNC: 89 U/L — SIGNIFICANT CHANGE UP (ref 40–120)
ALT FLD-CCNC: 21 U/L — SIGNIFICANT CHANGE UP (ref 4–33)
ANION GAP SERPL CALC-SCNC: 11 MMOL/L — SIGNIFICANT CHANGE UP (ref 7–14)
AST SERPL-CCNC: 35 U/L — HIGH (ref 4–32)
BASOPHILS # BLD AUTO: 0.01 K/UL — SIGNIFICANT CHANGE UP (ref 0–0.2)
BASOPHILS NFR BLD AUTO: 0.1 % — SIGNIFICANT CHANGE UP (ref 0–2)
BILIRUB SERPL-MCNC: 1 MG/DL — SIGNIFICANT CHANGE UP (ref 0.2–1.2)
BUN SERPL-MCNC: 22 MG/DL — SIGNIFICANT CHANGE UP (ref 7–23)
CALCIUM SERPL-MCNC: 9.3 MG/DL — SIGNIFICANT CHANGE UP (ref 8.4–10.5)
CANCER AG19-9 SERPL-ACNC: 100 U/ML — HIGH
CEA SERPL-MCNC: 1.8 NG/ML — SIGNIFICANT CHANGE UP (ref 1–3.8)
CHLORIDE SERPL-SCNC: 104 MMOL/L — SIGNIFICANT CHANGE UP (ref 98–107)
CO2 SERPL-SCNC: 22 MMOL/L — SIGNIFICANT CHANGE UP (ref 22–31)
CREAT SERPL-MCNC: 1.14 MG/DL — SIGNIFICANT CHANGE UP (ref 0.5–1.3)
EGFR: 47 ML/MIN/1.73M2 — LOW
EOSINOPHIL # BLD AUTO: 0.02 K/UL — SIGNIFICANT CHANGE UP (ref 0–0.5)
EOSINOPHIL NFR BLD AUTO: 0.3 % — SIGNIFICANT CHANGE UP (ref 0–6)
GLUCOSE SERPL-MCNC: 106 MG/DL — HIGH (ref 70–99)
HCT VFR BLD CALC: 40.7 % — SIGNIFICANT CHANGE UP (ref 34.5–45)
HGB BLD-MCNC: 12.9 G/DL — SIGNIFICANT CHANGE UP (ref 11.5–15.5)
IANC: 6.56 K/UL — SIGNIFICANT CHANGE UP (ref 1.8–7.4)
IMM GRANULOCYTES NFR BLD AUTO: 0.4 % — SIGNIFICANT CHANGE UP (ref 0–0.9)
LYMPHOCYTES # BLD AUTO: 0.37 K/UL — LOW (ref 1–3.3)
LYMPHOCYTES # BLD AUTO: 5.2 % — LOW (ref 13–44)
MAGNESIUM SERPL-MCNC: 2 MG/DL — SIGNIFICANT CHANGE UP (ref 1.6–2.6)
MCHC RBC-ENTMCNC: 30.2 PG — SIGNIFICANT CHANGE UP (ref 27–34)
MCHC RBC-ENTMCNC: 31.7 GM/DL — LOW (ref 32–36)
MCV RBC AUTO: 95.3 FL — SIGNIFICANT CHANGE UP (ref 80–100)
MONOCYTES # BLD AUTO: 0.11 K/UL — SIGNIFICANT CHANGE UP (ref 0–0.9)
MONOCYTES NFR BLD AUTO: 1.5 % — LOW (ref 2–14)
NEUTROPHILS # BLD AUTO: 6.56 K/UL — SIGNIFICANT CHANGE UP (ref 1.8–7.4)
NEUTROPHILS NFR BLD AUTO: 92.5 % — HIGH (ref 43–77)
NRBC # BLD: 0 /100 WBCS — SIGNIFICANT CHANGE UP (ref 0–0)
NRBC # FLD: 0 K/UL — SIGNIFICANT CHANGE UP (ref 0–0)
PHOSPHATE SERPL-MCNC: 2 MG/DL — LOW (ref 2.5–4.5)
PLATELET # BLD AUTO: 410 K/UL — HIGH (ref 150–400)
POTASSIUM SERPL-MCNC: 4 MMOL/L — SIGNIFICANT CHANGE UP (ref 3.5–5.3)
POTASSIUM SERPL-SCNC: 4 MMOL/L — SIGNIFICANT CHANGE UP (ref 3.5–5.3)
PROT SERPL-MCNC: 7.5 G/DL — SIGNIFICANT CHANGE UP (ref 6–8.3)
RBC # BLD: 4.27 M/UL — SIGNIFICANT CHANGE UP (ref 3.8–5.2)
RBC # FLD: 13 % — SIGNIFICANT CHANGE UP (ref 10.3–14.5)
SODIUM SERPL-SCNC: 137 MMOL/L — SIGNIFICANT CHANGE UP (ref 135–145)
WBC # BLD: 7.1 K/UL — SIGNIFICANT CHANGE UP (ref 3.8–10.5)
WBC # FLD AUTO: 7.1 K/UL — SIGNIFICANT CHANGE UP (ref 3.8–10.5)

## 2023-04-22 RX ORDER — POLYETHYLENE GLYCOL 3350 17 G/17G
17 POWDER, FOR SOLUTION ORAL
Qty: 0 | Refills: 0 | DISCHARGE
Start: 2023-04-22

## 2023-04-22 RX ORDER — SENNA PLUS 8.6 MG/1
2 TABLET ORAL
Qty: 0 | Refills: 0 | DISCHARGE
Start: 2023-04-22

## 2023-04-22 RX ORDER — LATANOPROST 0.05 MG/ML
1 SOLUTION/ DROPS OPHTHALMIC; TOPICAL
Qty: 0 | Refills: 0 | DISCHARGE
Start: 2023-04-22

## 2023-04-22 RX ADMIN — MUPIROCIN 1 APPLICATION(S): 20 OINTMENT TOPICAL at 06:50

## 2023-04-22 RX ADMIN — Medication 63.75 MILLIMOLE(S): at 09:03

## 2023-04-22 RX ADMIN — Medication 1 TABLET(S): at 11:45

## 2023-04-22 RX ADMIN — MUPIROCIN 1 APPLICATION(S): 20 OINTMENT TOPICAL at 18:47

## 2023-04-22 RX ADMIN — Medication 100 MILLIGRAM(S): at 14:14

## 2023-04-22 RX ADMIN — Medication 75 MICROGRAM(S): at 06:44

## 2023-04-22 RX ADMIN — HEPARIN SODIUM 5000 UNIT(S): 5000 INJECTION INTRAVENOUS; SUBCUTANEOUS at 18:47

## 2023-04-22 RX ADMIN — HEPARIN SODIUM 5000 UNIT(S): 5000 INJECTION INTRAVENOUS; SUBCUTANEOUS at 06:43

## 2023-04-22 RX ADMIN — POLYETHYLENE GLYCOL 3350 17 GRAM(S): 17 POWDER, FOR SOLUTION ORAL at 18:48

## 2023-04-22 RX ADMIN — Medication 1 APPLICATION(S): at 11:45

## 2023-04-22 RX ADMIN — POLYETHYLENE GLYCOL 3350 17 GRAM(S): 17 POWDER, FOR SOLUTION ORAL at 06:43

## 2023-04-22 RX ADMIN — SODIUM CHLORIDE 100 MILLILITER(S): 9 INJECTION, SOLUTION INTRAVENOUS at 02:42

## 2023-04-22 NOTE — DISCHARGE NOTE PROVIDER - NSDCCPCAREPLAN_GEN_ALL_CORE_FT
PRINCIPAL DISCHARGE DIAGNOSIS  Diagnosis: Bladder mass  Assessment and Plan of Treatment: When you came to the ED, our scans showed that you had a bladder mass that could be causing your symptoms. Our urology team advised that you have the mass removed in the operating room. During the procedure it was noted that you actually had two masses in the bladder. Parts of both were removed and sent for pathology. You were able to tolerate the procedure & had no complications. You will need to closely follow up with your urologist to receive reports of the cause of the mass.  It was also noted that you had a rectal mass that potentially could be invasion of the bladder mass. You were seen by our colorectal surgery team, who recommneded a colonoscopy after reports of the bladder mass pathology are assessed.

## 2023-04-22 NOTE — PROGRESS NOTE ADULT - PROBLEM SELECTOR PROBLEM 6
Need for prophylactic measure
Need for prophylactic measure
Pre-operative clearance

## 2023-04-22 NOTE — DISCHARGE NOTE PROVIDER - CARE PROVIDERS DIRECT ADDRESSES
[email or web address]

## 2023-04-22 NOTE — PROGRESS NOTE ADULT - SUBJECTIVE AND OBJECTIVE BOX
Anibal Calvert  PGY-1 Resident Physician     Patient is a 88y old  Female who presents with a chief complaint of abdominal pain (21 Apr 2023 14:49)      SUBJECTIVE / OVERNIGHT EVENTS:  NAEON. Denies abdominal pain, dysuria, hematuria. Remaining ROS per prior.     MEDICATIONS  (STANDING):  atorvastatin 10 milliGRAM(s) Oral at bedtime  dibucaine 1% Ointment 1 Application(s) Topical daily  donepezil 10 milliGRAM(s) Oral at bedtime  gemcitabine Bladder Irrigation (eMAR) 2000 milliGRAM(s) IntraVesical once  heparin   Injectable 5000 Unit(s) SubCutaneous every 12 hours  hydrALAZINE 100 milliGRAM(s) Oral every 8 hours  lactated ringers. 1000 milliLiter(s) (100 mL/Hr) IV Continuous <Continuous>  lactobacillus acidophilus 1 Tablet(s) Oral daily  latanoprost 0.005% Ophthalmic Solution 1 Drop(s) Both EYES at bedtime  levothyroxine 75 MICROGram(s) Oral daily  melatonin 3 milliGRAM(s) Oral at bedtime  multivitamin 1 Tablet(s) Oral daily  mupirocin 2% Ointment 1 Application(s) Topical two times a day  polyethylene glycol 3350 17 Gram(s) Oral two times a day  senna 2 Tablet(s) Oral at bedtime  sodium phosphate 15 milliMole(s)/250 mL IVPB 15 milliMole(s) IV Intermittent once    MEDICATIONS  (PRN):  acetaminophen     Tablet .. 650 milliGRAM(s) Oral every 6 hours PRN Temp greater or equal to 38C (100.4F), Mild Pain (1 - 3)  lidocaine   4% Patch 1 Patch Transdermal daily PRN Left Neck Pain  lidocaine 2% Jelly 3 milliLiter(s) IntraUrethral daily PRN Pain    Allergies    aspirin (Flushing)  penicillin (Unknown)    Intolerances        Vital Signs Last 24 Hrs  T(C): 37.5 (22 Apr 2023 05:50), Max: 37.5 (22 Apr 2023 05:50)  T(F): 99.5 (22 Apr 2023 05:50), Max: 99.5 (22 Apr 2023 05:50)  HR: 85 (22 Apr 2023 05:50) (85 - 94)  BP: 145/53 (22 Apr 2023 05:50) (138/56 - 165/73)  BP(mean): --  RR: 18 (22 Apr 2023 05:50) (17 - 18)  SpO2: 99% (22 Apr 2023 05:50) (99% - 100%)    Parameters below as of 22 Apr 2023 05:50  Patient On (Oxygen Delivery Method): room air      Daily     Daily   I&O's Summary    21 Apr 2023 07:01  -  22 Apr 2023 07:00  --------------------------------------------------------  IN: 2046 mL / OUT: 0 mL / NET: 2046 mL        Physical Exam  GENERAL: NAD. Well-developed.  NEUROLOGICAL: A&O x 4. CN2-12. Strength, Sensation, ROM wnl.               NECK: (+) on right shoulder upon palpation  CARDIAC: RRR w/ normal S1 & S2. No murmurs, rubs. & gallops. Radial & dorsal pedis pulses intact. No carotid bruits.   PULMONARY: CTA b/l w/o accessory muscle use.   GI: Soft, NT, ND, no rebound, no guarding. NABS in 4 quads.   RENAL: No lower extremity edema. No CVA tenderness. (+) hernandez inplace  MSK/DERM:  Examination of the (head/neck/spine/ribs/pelvis, RUE, LUE, RLE, LLE) without misalignment.   PSYCH: Appropriate insight/judgment    DIAGNOSTICS:                         12.9   7.10  )-----------( 410      ( 22 Apr 2023 06:37 )             40.7     Hgb Trend: 12.9<--, 12.5<--, 13.2<--, 13.9<--, 12.5<--  04-22    137  |  104  |  22  ----------------------------<  106<H>  4.0   |  22  |  1.14    Ca    9.3      22 Apr 2023 06:37  Phos  2.0     04-22  Mg     2.00     04-22    TPro  7.5  /  Alb  3.6  /  TBili  1.0  /  DBili  x   /  AST  35<H>  /  ALT  21  /  AlkPhos  89  04-22    CAPILLARY BLOOD GLUCOSE        Creatinine Trend: 1.14<--, 1.30<--, 1.31<--, 1.25<--, 1.20<--, 1.28<--  LIVER FUNCTIONS - ( 22 Apr 2023 06:37 )  Alb: 3.6 g/dL / Pro: 7.5 g/dL / ALK PHOS: 89 U/L / ALT: 21 U/L / AST: 35 U/L / GGT: x

## 2023-04-22 NOTE — PROGRESS NOTE ADULT - ASSESSMENT
87 y/o female s/p TURBT, stable.     -Strict I&O's  -Can expect hematuria post Cystoscopy/TURBT.  -Analgesia prn, lidocaine jelly ordered for urethral discomfort  -can resume DVT prophylaxis  -Resume diet  -Incentive spirometry  -OOB    
87yo F admitted for abdominal pain secondary to presumed pancreatitis found to have large bladder mass on CT imaging.    Recommendations  - Plan for OR today for TURBT with Dr. Soto  - Discussed with daughter, consent in chart   - COVID negative status appreciated  - NPO    Urology   #[id number]
87yo F admitted for abdominal pain secondary to presumed pancreatitis found to have large bladder mass on CT imaging.  S/p TURBT 4/20    Recommendations  - hernandez removed by urology this morning, f/u TOV  - urine may be intermittently bloody  - discussed with daughter, pathology from biopsy may take about 1 week for result  - patient to follow up with Dr. Soto in 1-2 weeks  - D/w Dr. Soto  - please call urology with further questions    Urology   #[id number]
88F w/ HTN, HLD, Hypothyroidism, & dementia w/ abdominal pain & increased urinary frequency. ED course relevant for lipase of 130 w/ CT A/P w/ notable bladder mass. Admitted for w/u of bladder mass. 
89yo F admitted for abdominal pain secondary to presumed pancreatitis found to have large bladder mass on CT imaging.    Recommendations  - Plan for OR today for TURBT   - Discussed with daughter, consent in chart   - COVID negative status appreciated  - Patient will need two active type and screens (sent to lab)  - NPO  - Please document medical clearance for OR  - Discussed with Dr. Soto    Urology   #[id number]
88F w/ HTN, HLD, Hypothyroidism, & dementia w/ abdominal pain & increased urinary frequency. ED course relevant for lipase of 130 w/ CT A/P w/ notable bladder mass. Admitted for w/u of bladder mass. 

## 2023-04-22 NOTE — PROGRESS NOTE ADULT - PROBLEM SELECTOR PLAN 6
-DVT Ppx: hep sq  -Diet: regular  -PRNs: senna, ducolax, miralax, seroquel (agitation)  -Code Status: Full  -Dispo: inpt mgmt
-Vital, Labs wnl  -Physical Exam     -Noted systolic murmur, will require cardiac clearance  -Cardiac clearance     > f/u echo, ekg   -Chronic medical conditions     -HTN: stable     -HLD: stable     -Hypothyroidism: stable     -Dementia: stable    Patient not clear for surgery at this point
-DVT Ppx: hep sq  -Diet: regular  -PRNs: senna, ducolax, miralax, seroquel (agitation)  -Code Status: Full  -Dispo: inpt mgmt
-Vital, Labs wnl  -Cardiac clearance     > echo (4/18) w/o systolic or diastolic severe dysfunction     > ekg wnl  -Chronic medical conditions     -HTN: stable     -HLD: stable     -Hypothyroidism: stable     -Dementia: stable    Patient is clear for surgery at this point
-Vital, Labs wnl  -Cardiac clearance     > echo (4/18) w/o systolic or diastolic severe dysfunction     > ekg wnl  -Chronic medical conditions     -HTN: stable     -HLD: stable     -Hypothyroidism: stable     -Dementia: stable    Patient is clear for surgery at this point

## 2023-04-22 NOTE — DISCHARGE NOTE PROVIDER - CARE PROVIDER_API CALL
Lori Kinsey)  Internal Medicine  [address], [address]  [place] [address]  Phone: [phone]  Fax: [phone]  Follow Up Time:

## 2023-04-22 NOTE — PROGRESS NOTE ADULT - PROVIDER SPECIALTY LIST ADULT
Urology
Urology
Internal Medicine
Internal Medicine
Urology
Anesthesia
Internal Medicine
Urology
Internal Medicine
Internal Medicine

## 2023-04-22 NOTE — DISCHARGE NOTE NURSING/CASE MANAGEMENT/SOCIAL WORK - NSDCPEFALRISK_GEN_ALL_CORE
For information on Fall & Injury Prevention, visit: [email or web address] OR  [email or web address] OR  https://www.cdc.gov/steadi/patient.html

## 2023-04-22 NOTE — DISCHARGE NOTE NURSING/CASE MANAGEMENT/SOCIAL WORK - PATIENT PORTAL LINK FT
You can access the FollowMyHealth Patient Portal offered by [facility] by registering at the following website: [email or web address]. By joining [facility]’s FollowMyHealth portal, you will also be able to view your health information using other applications (apps) compatible with our system.

## 2023-04-22 NOTE — PROGRESS NOTE ADULT - PROBLEM SELECTOR PLAN 2
> c/w home hydralazine 100 TID

## 2023-04-22 NOTE — DISCHARGE NOTE PROVIDER - DETAILS OF MALNUTRITION DIAGNOSIS/DIAGNOSES
This patient has been assessed with a concern for Malnutrition and was treated during this hospitalization for the following Nutrition diagnosis/diagnoses:     -  04/19/2023: Severe protein-calorie malnutrition

## 2023-04-22 NOTE — PROGRESS NOTE ADULT - PROBLEM SELECTOR PLAN 5
> c/w home donepezil

## 2023-04-22 NOTE — DISCHARGE NOTE PROVIDER - NSDCCPTREATMENT_GEN_ALL_CORE_FT
PRINCIPAL PROCEDURE  Procedure: TURBT, with chemotherapeutic agent instillation into bladder  Findings and Treatment: Our urology team placed a camera in the bladder to visualize the baldder masses orginially seen on our CT scan. It was determined that there were 2 large masses in the bladder. Parts of both masses were removed and sent for pathology. You will need to follow with urology to receive reports of what the bladder mass was and the following treatment.

## 2023-04-22 NOTE — DISCHARGE NOTE PROVIDER - NSFOLLOWUPCLINICS_GEN_ALL_ED_FT
[facility] [place]  Urology  [address] [place] [address], [place] [address]  Phone: [phone]  Fax:

## 2023-04-22 NOTE — DISCHARGE NOTE PROVIDER - NSDCFUADDAPPT_GEN_ALL_CORE_FT
Please follow up with [facility]rology within 2 weeks of leaving the hospital to receive reports of your bladder mass.     Please follow up with your PCP within 2 weeks of leaving hospital.

## 2023-04-22 NOTE — PROGRESS NOTE ADULT - NUTRITIONAL ASSESSMENT
This patient has been assessed with a concern for Malnutrition and has been determined to have a diagnosis/diagnoses of Severe protein-calorie malnutrition.    This patient is being managed with:   Diet DASH/TLC-  Sodium & Cholesterol Restricted  Supplement Feeding Modality:  Oral  Ensure Clear Cans or Servings Per Day:  2       Frequency:  Daily  Ensure Plus High Protein Cans or Servings Per Day:  1       Frequency:  Daily  Entered: Apr 19 2023  3:07PM    Diet NPO after Midnight-     NPO Start Date: 19-Apr-2023   NPO Start Time: 23:59  Except Medications  Entered: Apr 19 2023 12:52PM  
This patient has been assessed with a concern for Malnutrition and has been determined to have a diagnosis/diagnoses of Severe protein-calorie malnutrition.    This patient is being managed with:   Diet DASH/TLC-  Sodium & Cholesterol Restricted  Supplement Feeding Modality:  Oral  Ensure Clear Cans or Servings Per Day:  2       Frequency:  Daily  Ensure Plus High Protein Cans or Servings Per Day:  1       Frequency:  Daily  Entered: Apr 19 2023  3:07PM  
This patient has been assessed with a concern for Malnutrition and has been determined to have a diagnosis/diagnoses of Severe protein-calorie malnutrition.    This patient is being managed with:   Diet DASH/TLC-  Sodium & Cholesterol Restricted  Supplement Feeding Modality:  Oral  Ensure Clear Cans or Servings Per Day:  2       Frequency:  Daily  Ensure Plus High Protein Cans or Servings Per Day:  1       Frequency:  Daily  Entered: Apr 19 2023  3:07PM

## 2023-04-22 NOTE — DISCHARGE NOTE PROVIDER - HOSPITAL COURSE
88F w/ HTN, HLD, Hypothyroidism, & dementia w/ abdominal pain & increased urinary frequency. ED course relevant for lipase of 130 w/ CT A/P w/ notable bladder mass ( 5.6 x 2.1 x 5.3 cm). Vitals signs, labs were otherwise stable. Urology consulted, with recommendations of cystoscopy & TURBT. Admitted for w/u of bladder mass.     Upon admission, continued home meds without major complications. Pt went to OR for cystoscopy & TURBT. Cystoscopy relevant for two notables masses. TURBT of left lateral posterior wall and Right lateral posterior wall masses. Samples excised of both masses for pathology. Of note, urology recommended colorectal surg c/s d/t to suspicion of bladder mass invasion of anterior rectum d/t palpation of rectal mass on JORGE ALBERTO. Colorectal surgery recommending colonoscopy upon return of path reports.     On day of d/c, pt HDS & afebrile. Pt will require close f/u w/ Urology for final report of bladder masses and for discussion of rectal mass.

## 2023-04-22 NOTE — PROGRESS NOTE ADULT - PROBLEM SELECTOR PLAN 4
> c/w home synthroid

## 2023-04-22 NOTE — DISCHARGE NOTE NURSING/CASE MANAGEMENT/SOCIAL WORK - NSDCFUADDAPPT_GEN_ALL_CORE_FT
Please follow up with [facility]rology within 2 weeks of leaving the hospital to receive reports of your bladder mass.     Please follow up with your PCP within 2 weeks of leaving hospital.

## 2023-04-22 NOTE — DISCHARGE NOTE PROVIDER - NSDCMRMEDTOKEN_GEN_ALL_CORE_FT
atorvastatin 10 mg oral tablet: 1 orally once a day (at bedtime)  donepezil 10 mg oral tablet: 1 orally once a day (at bedtime)  hydrALAZINE 100 mg oral tablet: 1 orally 3 times a day  latanoprost 0.005% ophthalmic solution: 1 drop(s) to each affected eye once a day (at bedtime)  Synthroid 75 mcg (0.075 mg) oral tablet: 1 orally once a day   atorvastatin 10 mg oral tablet: 1 orally once a day (at bedtime)  donepezil 10 mg oral tablet: 1 orally once a day (at bedtime)  hydrALAZINE 100 mg oral tablet: 1 orally 3 times a day  latanoprost 0.005% ophthalmic solution: 1 drop(s) to each affected eye once a day (at bedtime)  polyethylene glycol 3350 oral powder for reconstitution: 17 gram(s) orally 2 times a day  senna leaf extract oral tablet: 2 tab(s) orally once a day (at bedtime)  Synthroid 75 mcg (0.075 mg) oral tablet: 1 orally once a day

## 2023-04-22 NOTE — PROGRESS NOTE ADULT - ATTENDING COMMENTS
Pt is an 89 yo F with PMH HTN and dementia p/w abd pain, N/V. Found to have 5x5cm bladder mass s/p TURBT 4/20 with urology. During procedure, c/f rectal mass, for which CRC Sx consulted and recommending CEA/CA 19-9 (in lab) and outpt c-scope. Thomas dc'd 4/21 and passed TOV. Planned for enema today to assist with BM. Discussed with pt and daughter bedside, amenable to outpt CRC Sx, GI, and uro f/u. Inquired about HHA, however, explained will need to private hire d/t lack of insurance coverage. PT recs [facility] however family preferred home with home PT. Discussed with Dr. Calvert. Rest as outlined above.

## 2023-04-22 NOTE — DISCHARGE NOTE PROVIDER - ATTENDING DISCHARGE PHYSICAL EXAMINATION:
Vital Signs Last 24 Hrs  T(C): 37.5 (22 Apr 2023 05:50), Max: 37.5 (22 Apr 2023 05:50)  T(F): 99.5 (22 Apr 2023 05:50), Max: 99.5 (22 Apr 2023 05:50)  HR: 85 (22 Apr 2023 05:50) (85 - 94)  BP: 145/53 (22 Apr 2023 05:50) (138/56 - 165/73)  BP(mean): --  RR: 18 (22 Apr 2023 05:50) (17 - 18)  SpO2: 99% (22 Apr 2023 05:50) (99% - 100%)    Parameters below as of 22 Apr 2023 05:50  Patient On (Oxygen Delivery Method): room air      Daily     Daily   I&O's Summary    21 Apr 2023 07:01  -  22 Apr 2023 07:00  --------------------------------------------------------  IN: 2046 mL / OUT: 0 mL / NET: 2046 mL        Physical Exam  GENERAL: NAD. Well-developed.  NEUROLOGICAL: A&O x 4. CN2-12. Strength, Sensation, ROM wnl.               NECK: (+) on right shoulder upon palpation  CARDIAC: RRR w/ normal S1 & S2. No murmurs, rubs. & gallops. Radial & dorsal pedis pulses intact. No carotid bruits.   PULMONARY: CTA b/l w/o accessory muscle use.   GI: Soft, NT, ND, no rebound, no guarding. NABS in 4 quads.   RENAL: No lower extremity edema. No CVA tenderness. (+) hernandez inplace  MSK/DERM:  Examination of the (head/neck/spine/ribs/pelvis, RUE, LUE, RLE, LLE) without misalignment.   PSYCH: Appropriate insight/judgment

## 2023-04-22 NOTE — PROGRESS NOTE ADULT - PROBLEM SELECTOR PLAN 1
-CT A/P on admission w/ 5.6 x 2.1 x 5.3 cm bladder mass  -pt endorsing increased urinary freq  > f/u urology recs     > will proceed to cystoscopy & TUBRT after medical clearance
-CT A/P on admission w/ 5.6 x 2.1 x 5.3 cm bladder mass  -pt endorsing increased urinary freq  > f/u urology recs     > will proceed to cystoscopy & TUBRT today
-CT A/P on admission w/ 5.6 x 2.1 x 5.3 cm bladder mass  -pt endorsing increased urinary freq  > f/u urology recs     > will proceed to cystoscopy & TUBRT after medical clearance
-CT A/P on admission w/ 5.6 x 2.1 x 5.3 cm bladder mass  -pt endorsing increased urinary freq  -s/p OR w/ excision of mass at urethral orifice  > f/u urology recs     > f/u path report     > will remove hernandez today     > consider Colorectal surgery c/s for assessment for anal mass
-CT A/P on admission w/ 5.6 x 2.1 x 5.3 cm bladder mass  -pt endorsing increased urinary freq  -s/p OR w/ excision of mass at urethral orifice  > f/u urology recs     > f/u path report     > will remove hernandez today  > colorectal recommends serum studies     > can be completed OP

## 2023-05-02 LAB — SURGICAL PATHOLOGY STUDY: SIGNIFICANT CHANGE UP

## 2023-05-24 NOTE — ADDENDUM
[FreeTextEntry1] : This note was authored by Leonardo Ernandez working as a scribe for Dr. Greg Aggarwal. I, Dr. Greg Aggarwal have reviewed the content of this note and confirm it is true and accurate. I personally performed the history and physical examination and made all the decisions. 05/24/2023

## 2023-05-24 NOTE — HISTORY OF PRESENT ILLNESS
[FreeTextEntry1] : 05/24/2023: Ms. MERINO is a 88 year old female presenting today s/p transurethral resection of bladder tumor on 04/20/2023 at [facility]. The patient had two masses in the bladder in the left side. One frondular field of approximatively 2.5 to 3 cm in size. A second more nodular, less frondular very near the ureteral orifice. The masses were resected, cauterized and the patient was subsequently catheterized.  She is accompanied today by her daughter. She does not have a catheter today. Her daughter reports that the patient still experiences urinary frequency, which is worsened when she takes a prescribed diuretic for Hypertension. She denies gross hematuria. I reviewed and discussed the patient's pathology report. I explain to the patient that the bladder tumors were aggressive but they had not grown into the muscle of the bladder. She inquired about the mass in her rectum wall and I recommend that she sees a GI and offer to send a consult. 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 \par \par I counseled the patient. I discussed the various etiologies of her symptoms. Risks and alternatives were discussed. I answered the patient questions. The patient will follow-up as directed and will contact me with any questions or concerns. Thank you for the opportunity to participate in the care of this patient. I'll keep you updated on her progress.

## 2023-08-28 PROBLEM — C67.9 MALIGNANT NEOPLASM OF URINARY BLADDER, UNSPECIFIED SITE: Status: ACTIVE | Noted: 2023-01-01

## 2023-08-28 NOTE — HISTORY OF PRESENT ILLNESS
[FreeTextEntry1] : 05/24/2023: Ms. MERINO is a 88 year old female presenting today s/p transurethral resection of bladder tumor on 04/20/2023 at [facility]. The patient had two masses in the bladder in the left side. One frondular field of approximatively 2.5 to 3 cm in size. A second more nodular, less frondular very near the ureteral orifice. The masses were resected, cauterized and the patient was subsequently catheterized.  She is accompanied today by her daughter. She does not have a catheter today. Her daughter reports that the patient still experiences urinary frequency, which is worsened when she takes a prescribed diuretic for Hypertension. She denies gross hematuria. I reviewed and discussed the patient's pathology report. I explain to the patient that the bladder tumors were aggressive but they had not grown into the muscle of the bladder. She inquired about the mass in her rectum wall and I recommend that she sees a GI and offer to send a consult.  ASSESSMENT: Aggressive high grade urothelial carcinoma noninvasive  PLAN: Cysto in 3 months, Consult sent Gastroenterologist.   08/28/2023: Ms. NATA MERINO is a 88-year-old patient s/p transurethral resection of high grade urothelial noninvasive of bladder tumor on 04/20/2023. She presents today for a surveillance cystoscopy.   After informed consent was obtained, the patient was placed in a supine position, sterilly draped and prepped with betadine. 10 cc of 2% Lidocaine Gel was administered intraurethrally.  A flexible cystoscope was used to visualize the urethra and bladder. The urethra was normal. The bladder outlet resolving scar tissue in area of resection.  The ureteral orifices were in the normal anatomic position bilaterally and had clear efflux of urine bilaterally. No bladder tumor visualized. Mucosal abnormality was not present in the bladder wall. The bladder wall demonstrates no trabeculation.    A voided urine specimen was obtained and sent for cytology. There were no complications.   ASSESSMENT: Additional Procedural Findings/Comments: resolving scar tissue in area of resection.    Plan: Follow up in 3 month for Surveillance Cystoscopy.

## 2023-08-28 NOTE — ADDENDUM
[FreeTextEntry1] : This note was authored by Leonardo Ernandez working as a scribe for Dr. Greg Aggarwal. I, Dr. Greg Aggarwal have reviewed the content of this note and confirm it is true and accurate. I personally performed the history and physical examination and made all the decisions. 08/28/2023

## 2024-11-19 NOTE — DIETITIAN INITIAL EVALUATION ADULT - ETIOLOGY-BASIS
Caller: Mariama Turner    Relationship: Self    Best call back number: [phone]     Requested Prescriptions:   Requested Prescriptions     Pending Prescriptions Disp Refills    levothyroxine (Synthroid) 88 MCG tablet 90 tablet 1     Sig: Take 1 tablet by mouth Daily.    methocarbamol (ROBAXIN) 500 MG tablet 60 tablet 1     Sig: Take 1 tablet by mouth 3 (Three) Times a Day As Needed for Muscle Spasms.        Pharmacy where request should be sent: [place] [phone] [place] [phone]      Last office visit with prescribing clinician: 10/24/2024   Last telemedicine visit with prescribing clinician: Visit date not found   Next office visit with prescribing clinician: 4/24/2025     Additional details provided by patient: LESS THAN THREE DAY SUPPLY.     Does the patient have less than a 3 day supply:  [x] Yes  [] No        Marian Wren Rep   11/19/24 10:38 EST          Acute illness or injury

## (undated) DEVICE — POSITIONER STRAP ARMBOARD VELCRO TS-30

## (undated) DEVICE — SYR CATH TIP 2 OZ

## (undated) DEVICE — ELCTR PLASMA LOOP MEDIUM 24FR 12-30 DEG

## (undated) DEVICE — TUBING LEVEL ONE NORMOFLO SET

## (undated) DEVICE — FOLEY CATH 2-WAY 18FR 5CC SILICONE

## (undated) DEVICE — BAR ROLLER FOR ELITE ELCTR

## (undated) DEVICE — SOL IRR BAG H2O 3000ML

## (undated) DEVICE — VENODYNE/SCD SLEEVE CALF MEDIUM

## (undated) DEVICE — BAG URINE W METER 2L

## (undated) DEVICE — GLV 7.5 PROTEXIS (CREAM) MICRO

## (undated) DEVICE — GLV 8 PROTEXIS (CREAM) MICRO

## (undated) DEVICE — WARMING BLANKET UPPER ADULT

## (undated) DEVICE — CABLE DAC ACTIVE CORD

## (undated) DEVICE — CANISTER DISPOSABLE THIN WALL 3000CC

## (undated) DEVICE — PACK CYSTO

## (undated) DEVICE — SOL IRR POUR H2O 500ML

## (undated) DEVICE — ELCTR GROUNDING PAD ADULT COVIDIEN